# Patient Record
Sex: FEMALE | Race: WHITE | NOT HISPANIC OR LATINO | Employment: FULL TIME | URBAN - METROPOLITAN AREA
[De-identification: names, ages, dates, MRNs, and addresses within clinical notes are randomized per-mention and may not be internally consistent; named-entity substitution may affect disease eponyms.]

---

## 2017-01-23 ENCOUNTER — HISTORICAL (OUTPATIENT)
Dept: RADIOLOGY | Facility: HOSPITAL | Age: 52
End: 2017-01-23

## 2017-02-20 ENCOUNTER — HISTORICAL (OUTPATIENT)
Dept: ADMINISTRATIVE | Facility: HOSPITAL | Age: 52
End: 2017-02-20

## 2017-02-27 ENCOUNTER — HISTORICAL (OUTPATIENT)
Dept: INTERNAL MEDICINE | Facility: CLINIC | Age: 52
End: 2017-02-27

## 2017-03-03 ENCOUNTER — HISTORICAL (OUTPATIENT)
Dept: RADIOLOGY | Facility: HOSPITAL | Age: 52
End: 2017-03-03

## 2017-03-30 ENCOUNTER — HISTORICAL (OUTPATIENT)
Dept: INTERNAL MEDICINE | Facility: CLINIC | Age: 52
End: 2017-03-30

## 2017-05-26 ENCOUNTER — HISTORICAL (OUTPATIENT)
Dept: INTERNAL MEDICINE | Facility: CLINIC | Age: 52
End: 2017-05-26

## 2017-05-26 LAB
ABS NEUT (OLG): 4.62 X10(3)/MCL (ref 2.1–9.2)
ALBUMIN SERPL-MCNC: 4.3 GM/DL (ref 3.4–5)
ALBUMIN/GLOB SERPL: 1 RATIO (ref 1–2)
ALP SERPL-CCNC: 81 UNIT/L (ref 20–120)
ALT SERPL-CCNC: 17 UNIT/L
APPEARANCE, UA: ABNORMAL
AST SERPL-CCNC: 16 UNIT/L
BACTERIA #/AREA URNS AUTO: ABNORMAL /[HPF]
BASOPHILS # BLD AUTO: 0.04 X10(3)/MCL
BASOPHILS NFR BLD AUTO: 0 % (ref 0–1)
BILIRUB SERPL-MCNC: 0.8 MG/DL
BILIRUB UR QL STRIP: NEGATIVE
BILIRUBIN DIRECT+TOT PNL SERPL-MCNC: 0.1 MG/DL
BILIRUBIN DIRECT+TOT PNL SERPL-MCNC: 0.7 MG/DL
BUN SERPL-MCNC: 22 MG/DL (ref 7–25)
CALCIUM SERPL-MCNC: 9.4 MG/DL (ref 8.4–10.3)
CHLORIDE SERPL-SCNC: 103 MMOL/L (ref 96–110)
CHOLEST SERPL-MCNC: 168 MG/DL
CHOLEST/HDLC SERPL: 4.4 {RATIO} (ref 0–4.4)
CO2 SERPL-SCNC: 28 MMOL/L (ref 24–32)
COLOR UR: ABNORMAL
CREAT SERPL-MCNC: 0.82 MG/DL (ref 0.7–1.1)
EOSINOPHIL # BLD AUTO: 0.15 10*3/UL
EOSINOPHIL NFR BLD AUTO: 2 % (ref 0–5)
ERYTHROCYTE [DISTWIDTH] IN BLOOD BY AUTOMATED COUNT: 12.9 % (ref 11.5–14.5)
GLOBULIN SER-MCNC: 2.9 GM/ML (ref 2.3–3.5)
GLUCOSE (UA): NORMAL
GLUCOSE SERPL-MCNC: 125 MG/DL (ref 65–99)
HCT VFR BLD AUTO: 41.4 % (ref 35–46)
HDLC SERPL-MCNC: 38 MG/DL
HGB BLD-MCNC: 13.9 GM/DL (ref 12–16)
HGB UR QL STRIP: 0.06 MG/DL
HYALINE CASTS #/AREA URNS LPF: ABNORMAL /[LPF]
IMM GRANULOCYTES # BLD AUTO: 0.04 10*3/UL
IMM GRANULOCYTES NFR BLD AUTO: 0 %
KETONES UR QL STRIP: NEGATIVE
LDLC SERPL CALC-MCNC: 110 MG/DL (ref 0–130)
LEUKOCYTE ESTERASE UR QL STRIP: 500 LEU/UL
LYMPHOCYTES # BLD AUTO: 2.42 X10(3)/MCL
LYMPHOCYTES NFR BLD AUTO: 30 % (ref 15–40)
MCH RBC QN AUTO: 31.8 PG (ref 26–34)
MCHC RBC AUTO-ENTMCNC: 33.6 GM/DL (ref 31–37)
MCV RBC AUTO: 94.7 FL (ref 80–100)
MONOCYTES # BLD AUTO: 0.7 X10(3)/MCL
MONOCYTES NFR BLD AUTO: 9 % (ref 4–12)
NEUTROPHILS # BLD AUTO: 4.62 X10(3)/MCL
NEUTROPHILS NFR BLD AUTO: 58 X10(3)/MCL
NITRITE UR QL STRIP: NEGATIVE
PH UR STRIP: 7 [PH] (ref 4.5–8)
PLATELET # BLD AUTO: 242 X10(3)/MCL (ref 130–400)
PMV BLD AUTO: 9.6 FL (ref 7.4–10.4)
POTASSIUM SERPL-SCNC: 4.6 MMOL/L (ref 3.6–5.2)
PROT SERPL-MCNC: 7.2 GM/DL (ref 6–8)
PROT UR QL STRIP: 10 MG/DL
RBC # BLD AUTO: 4.37 X10(6)/MCL (ref 4–5.2)
RBC #/AREA URNS AUTO: ABNORMAL /[HPF]
SODIUM SERPL-SCNC: 139 MMOL/L (ref 135–146)
SP GR UR STRIP: 1.01 (ref 1–1.03)
SQUAMOUS #/AREA URNS LPF: >100 /[LPF]
T4 SERPL-MCNC: 8.99 MCG/DL (ref 6.09–12.23)
TRIGL SERPL-MCNC: 99 MG/DL
TSH SERPL-ACNC: 0.52 MIU/L (ref 0.5–5)
UROBILINOGEN UR STRIP-ACNC: NORMAL
VLDLC SERPL CALC-MCNC: 20 MG/DL
WBC # SPEC AUTO: 8 X10(3)/MCL (ref 4.5–11)
WBC #/AREA URNS AUTO: ABNORMAL /HPF

## 2017-09-25 ENCOUNTER — HISTORICAL (OUTPATIENT)
Dept: INTERNAL MEDICINE | Facility: CLINIC | Age: 52
End: 2017-09-25

## 2017-09-25 LAB
CREAT UR-MCNC: 145 MG/DL
EST. AVERAGE GLUCOSE BLD GHB EST-MCNC: 123 MG/DL
HBA1C MFR BLD: 5.9 % (ref 4.2–6.3)
MICROALBUMIN UR-MCNC: 26.5 MG/L (ref 0–19)
MICROALBUMIN/CREAT RATIO PNL UR: 18.3 MCG/MG CR (ref 0–29)

## 2018-02-15 LAB
COLOR STL: NORMAL
COLOR STL: NORMAL
CONSISTENCY STL: NORMAL
CONSISTENCY STL: NORMAL
HEMOCCULT SP1 STL QL: NEGATIVE
HEMOCCULT SP2 STL QL: NEGATIVE

## 2018-02-16 LAB
COLOR STL: NORMAL
CONSISTENCY STL: NORMAL

## 2018-02-19 ENCOUNTER — HISTORICAL (OUTPATIENT)
Dept: NEPHROLOGY | Facility: CLINIC | Age: 53
End: 2018-02-19

## 2018-02-19 LAB
ALBUMIN SERPL-MCNC: 3.9 GM/DL (ref 3.4–5)
ALBUMIN/GLOB SERPL: 1 RATIO (ref 1–2)
ALP SERPL-CCNC: 93 UNIT/L (ref 45–117)
ALT SERPL-CCNC: 20 UNIT/L (ref 12–78)
APPEARANCE, UA: ABNORMAL
AST SERPL-CCNC: 14 UNIT/L (ref 15–37)
BACTERIA #/AREA URNS AUTO: ABNORMAL /[HPF]
BILIRUB SERPL-MCNC: 0.8 MG/DL (ref 0.2–1)
BILIRUB UR QL STRIP: NEGATIVE
BILIRUBIN DIRECT+TOT PNL SERPL-MCNC: 0.2 MG/DL
BILIRUBIN DIRECT+TOT PNL SERPL-MCNC: 0.6 MG/DL
BUN SERPL-MCNC: 19 MG/DL (ref 7–18)
CALCIUM SERPL-MCNC: 9.4 MG/DL (ref 8.5–10.1)
CHLORIDE SERPL-SCNC: 107 MMOL/L (ref 98–107)
CHOLEST SERPL-MCNC: 164 MG/DL
CHOLEST/HDLC SERPL: 3.6 {RATIO} (ref 0–4.4)
CO2 SERPL-SCNC: 30 MMOL/L (ref 21–32)
COLOR UR: YELLOW
CREAT SERPL-MCNC: 0.9 MG/DL (ref 0.6–1.3)
CREAT UR-MCNC: 169 MG/DL
EST. AVERAGE GLUCOSE BLD GHB EST-MCNC: 128 MG/DL
GLOBULIN SER-MCNC: 3.5 GM/ML (ref 2.3–3.5)
GLUCOSE (UA): NORMAL
GLUCOSE SERPL-MCNC: 104 MG/DL (ref 74–106)
HBA1C MFR BLD: 6.1 % (ref 4.2–6.3)
HDLC SERPL-MCNC: 46 MG/DL
HGB UR QL STRIP: 0.03 MG/DL
HYALINE CASTS #/AREA URNS LPF: ABNORMAL /[LPF]
KETONES UR QL STRIP: NEGATIVE
LDLC SERPL CALC-MCNC: 100 MG/DL (ref 0–130)
LEUKOCYTE ESTERASE UR QL STRIP: 500 LEU/UL
MICROALBUMIN UR-MCNC: 34.2 MG/L (ref 0–19)
MICROALBUMIN/CREAT RATIO PNL UR: 20.2 MCG/MG CR (ref 0–29)
NITRITE UR QL STRIP: ABNORMAL
PH UR STRIP: 7 [PH] (ref 4.5–8)
POTASSIUM SERPL-SCNC: 4.5 MMOL/L (ref 3.5–5.1)
PROT SERPL-MCNC: 7.4 GM/DL (ref 6.4–8.2)
PROT UR QL STRIP: 20 MG/DL
RBC #/AREA URNS AUTO: ABNORMAL /[HPF]
SODIUM SERPL-SCNC: 143 MMOL/L (ref 136–145)
SP GR UR STRIP: 1.02 (ref 1–1.03)
SQUAMOUS #/AREA URNS LPF: >100 /[LPF]
TRIGL SERPL-MCNC: 90 MG/DL
UROBILINOGEN UR STRIP-ACNC: NORMAL
VLDLC SERPL CALC-MCNC: 18 MG/DL
WBC #/AREA URNS AUTO: ABNORMAL /HPF

## 2018-05-17 LAB
HUMAN PAPILLOMAVIRUS (HPV): NORMAL
PAP RECOMMENDATION EXT: NORMAL
PAP SMEAR: NORMAL

## 2018-08-15 ENCOUNTER — HISTORICAL (OUTPATIENT)
Dept: INTERNAL MEDICINE | Facility: CLINIC | Age: 53
End: 2018-08-15

## 2018-08-15 LAB
APPEARANCE, UA: CLEAR
BACTERIA #/AREA URNS AUTO: ABNORMAL /[HPF]
BILIRUB UR QL STRIP: NEGATIVE
BUN SERPL-MCNC: 22 MG/DL (ref 7–18)
CALCIUM SERPL-MCNC: 9 MG/DL (ref 8.5–10.1)
CHLORIDE SERPL-SCNC: 107 MMOL/L (ref 98–107)
CO2 SERPL-SCNC: 30 MMOL/L (ref 21–32)
COLOR UR: ABNORMAL
CREAT SERPL-MCNC: 0.8 MG/DL (ref 0.6–1.3)
CREAT UR-MCNC: 103 MG/DL
CREAT/UREA NIT SERPL: 28
EST. AVERAGE GLUCOSE BLD GHB EST-MCNC: 128 MG/DL
GLUCOSE (UA): NORMAL
GLUCOSE SERPL-MCNC: 111 MG/DL (ref 74–106)
HBA1C MFR BLD: 6.1 % (ref 4.2–6.3)
HGB UR QL STRIP: 0.03 MG/DL
HYALINE CASTS #/AREA URNS LPF: ABNORMAL /[LPF]
KETONES UR QL STRIP: NEGATIVE
LEUKOCYTE ESTERASE UR QL STRIP: 250 LEU/UL
MICROALBUMIN UR-MCNC: 28 MG/L (ref 0–19)
MICROALBUMIN/CREAT RATIO PNL UR: 27.2 MCG/MG CR (ref 0–29)
NITRITE UR QL STRIP: ABNORMAL
PH UR STRIP: 6 [PH] (ref 4.5–8)
POTASSIUM SERPL-SCNC: 4.7 MMOL/L (ref 3.5–5.1)
PROT UR QL STRIP: NEGATIVE
RBC #/AREA URNS AUTO: ABNORMAL /[HPF]
SODIUM SERPL-SCNC: 142 MMOL/L (ref 136–145)
SP GR UR STRIP: 1.02 (ref 1–1.03)
SQUAMOUS #/AREA URNS LPF: ABNORMAL /[LPF]
UROBILINOGEN UR STRIP-ACNC: NORMAL
WBC #/AREA URNS AUTO: ABNORMAL /HPF

## 2018-09-18 ENCOUNTER — HISTORICAL (OUTPATIENT)
Dept: WOUND CARE | Facility: HOSPITAL | Age: 53
End: 2018-09-18

## 2018-09-18 LAB
APPEARANCE, UA: CLEAR
BACTERIA #/AREA URNS AUTO: ABNORMAL /[HPF]
BILIRUB UR QL STRIP: NEGATIVE
COLOR UR: ABNORMAL
GLUCOSE (UA): NORMAL
HGB UR QL STRIP: 0.06 MG/DL
HYALINE CASTS #/AREA URNS LPF: ABNORMAL /[LPF]
KETONES UR QL STRIP: NEGATIVE
LEUKOCYTE ESTERASE UR QL STRIP: 500 LEU/UL
NITRITE UR QL STRIP: NEGATIVE
PH UR STRIP: 5.5 [PH] (ref 4.5–8)
PROT UR QL STRIP: NEGATIVE
RBC #/AREA URNS AUTO: ABNORMAL /[HPF]
SP GR UR STRIP: 1.02 (ref 1–1.03)
SQUAMOUS #/AREA URNS LPF: ABNORMAL /[LPF]
UROBILINOGEN UR STRIP-ACNC: NORMAL
WBC #/AREA URNS AUTO: ABNORMAL /HPF

## 2018-12-06 ENCOUNTER — HISTORICAL (OUTPATIENT)
Dept: ADMINISTRATIVE | Facility: HOSPITAL | Age: 53
End: 2018-12-06

## 2018-12-06 LAB
ABS NEUT (OLG): 3.4 X10(3)/MCL (ref 2.1–9.2)
APPEARANCE, UA: CLEAR
BACTERIA #/AREA URNS AUTO: ABNORMAL /[HPF]
BASOPHILS # BLD AUTO: 0.05 X10(3)/MCL
BASOPHILS NFR BLD AUTO: 1 %
BILIRUB UR QL STRIP: NEGATIVE
BUN SERPL-MCNC: 22 MG/DL (ref 7–18)
CALCIUM SERPL-MCNC: 9.5 MG/DL (ref 8.5–10.1)
CHLORIDE SERPL-SCNC: 107 MMOL/L (ref 98–107)
CO2 SERPL-SCNC: 31 MMOL/L (ref 21–32)
COLOR UR: ABNORMAL
CREAT SERPL-MCNC: 0.9 MG/DL (ref 0.6–1.3)
CREAT/UREA NIT SERPL: 24
EOSINOPHIL # BLD AUTO: 0.26 X10(3)/MCL
EOSINOPHIL NFR BLD AUTO: 4 %
ERYTHROCYTE [DISTWIDTH] IN BLOOD BY AUTOMATED COUNT: 12.2 % (ref 11.5–14.5)
GLUCOSE (UA): NORMAL
GLUCOSE SERPL-MCNC: 89 MG/DL (ref 74–106)
HCT VFR BLD AUTO: 39.3 % (ref 35–46)
HGB BLD-MCNC: 13 GM/DL (ref 12–16)
HGB UR QL STRIP: NEGATIVE
HYALINE CASTS #/AREA URNS LPF: ABNORMAL /[LPF]
IMM GRANULOCYTES # BLD AUTO: 0.01 10*3/UL
IMM GRANULOCYTES NFR BLD AUTO: 0 %
KETONES UR QL STRIP: NEGATIVE
LEUKOCYTE ESTERASE UR QL STRIP: 75 LEU/UL
LYMPHOCYTES # BLD AUTO: 2.39 X10(3)/MCL
LYMPHOCYTES NFR BLD AUTO: 35 % (ref 13–40)
MCH RBC QN AUTO: 31.7 PG (ref 26–34)
MCHC RBC AUTO-ENTMCNC: 33.1 GM/DL (ref 31–37)
MCV RBC AUTO: 95.9 FL (ref 80–100)
MONOCYTES # BLD AUTO: 0.69 X10(3)/MCL
MONOCYTES NFR BLD AUTO: 10 % (ref 4–12)
NEUTROPHILS # BLD AUTO: 3.4 X10(3)/MCL
NEUTROPHILS NFR BLD AUTO: 50 X10(3)/MCL
NITRITE UR QL STRIP: NEGATIVE
PH UR STRIP: 6 [PH] (ref 4.5–8)
PLATELET # BLD AUTO: 228 X10(3)/MCL (ref 130–400)
PMV BLD AUTO: 9.6 FL (ref 7.4–10.4)
POTASSIUM SERPL-SCNC: 4.9 MMOL/L (ref 3.5–5.1)
PROT UR QL STRIP: NEGATIVE
RBC # BLD AUTO: 4.1 X10(6)/MCL (ref 4–5.2)
RBC #/AREA URNS AUTO: ABNORMAL /[HPF]
SODIUM SERPL-SCNC: 142 MMOL/L (ref 136–145)
SP GR UR STRIP: 1.01 (ref 1–1.03)
SQUAMOUS #/AREA URNS LPF: ABNORMAL /[LPF]
T4 FREE SERPL-MCNC: 1.14 NG/DL (ref 0.76–1.46)
T4 SERPL-MCNC: 11.9 MCG/DL (ref 4.8–13.9)
TSH SERPL-ACNC: 0.48 MIU/L (ref 0.36–3.74)
UROBILINOGEN UR STRIP-ACNC: NORMAL
WBC # SPEC AUTO: 6.8 X10(3)/MCL (ref 4.5–11)
WBC #/AREA URNS AUTO: ABNORMAL /HPF

## 2019-04-04 ENCOUNTER — HISTORICAL (OUTPATIENT)
Dept: ADMINISTRATIVE | Facility: HOSPITAL | Age: 54
End: 2019-04-04

## 2019-04-04 ENCOUNTER — HISTORICAL (OUTPATIENT)
Dept: INTERNAL MEDICINE | Facility: CLINIC | Age: 54
End: 2019-04-04

## 2019-04-04 LAB
ALBUMIN SERPL-MCNC: 3.6 GM/DL (ref 3.4–5)
ALBUMIN/GLOB SERPL: 1.2 RATIO (ref 1.1–2)
ALP SERPL-CCNC: 93 UNIT/L (ref 45–117)
ALT SERPL-CCNC: 18 UNIT/L (ref 12–78)
APPEARANCE, UA: CLEAR
AST SERPL-CCNC: 10 UNIT/L (ref 15–37)
BACTERIA #/AREA URNS AUTO: ABNORMAL /[HPF]
BILIRUB SERPL-MCNC: 0.4 MG/DL (ref 0.2–1)
BILIRUB UR QL STRIP: NEGATIVE
BILIRUBIN DIRECT+TOT PNL SERPL-MCNC: <0.1 MG/DL
BILIRUBIN DIRECT+TOT PNL SERPL-MCNC: ABNORMAL MG/DL
BUN SERPL-MCNC: 23 MG/DL (ref 7–18)
CALCIUM SERPL-MCNC: 8.7 MG/DL (ref 8.5–10.1)
CHLORIDE SERPL-SCNC: 110 MMOL/L (ref 98–107)
CHOLEST SERPL-MCNC: 139 MG/DL
CHOLEST/HDLC SERPL: 3.5 {RATIO} (ref 0–4.4)
CO2 SERPL-SCNC: 29 MMOL/L (ref 21–32)
COLOR UR: ABNORMAL
CREAT SERPL-MCNC: 0.9 MG/DL (ref 0.6–1.3)
CREAT UR-MCNC: 122 MG/DL
EST. AVERAGE GLUCOSE BLD GHB EST-MCNC: 131 MG/DL
GLOBULIN SER-MCNC: 3.1 GM/ML (ref 2.3–3.5)
GLUCOSE (UA): NORMAL
GLUCOSE SERPL-MCNC: 110 MG/DL (ref 74–106)
HBA1C MFR BLD: 6.2 % (ref 4.2–6.3)
HDLC SERPL-MCNC: 40 MG/DL
HGB UR QL STRIP: 0.06 MG/DL
HYALINE CASTS #/AREA URNS LPF: ABNORMAL /[LPF]
KETONES UR QL STRIP: NEGATIVE
LDLC SERPL CALC-MCNC: 61 MG/DL (ref 0–130)
LEUKOCYTE ESTERASE UR QL STRIP: 500 LEU/UL
MICROALBUMIN UR-MCNC: 72.6 MG/L (ref 0–19)
MICROALBUMIN/CREAT RATIO PNL UR: 59.5 MCG/MG CR (ref 0–29)
NITRITE UR QL STRIP: NEGATIVE
PH UR STRIP: 6 [PH] (ref 4.5–8)
POTASSIUM SERPL-SCNC: 4.2 MMOL/L (ref 3.5–5.1)
PROT SERPL-MCNC: 6.7 GM/DL (ref 6.4–8.2)
PROT UR QL STRIP: NEGATIVE
RBC #/AREA URNS AUTO: ABNORMAL /[HPF]
SODIUM SERPL-SCNC: 143 MMOL/L (ref 136–145)
SP GR UR STRIP: 1.01 (ref 1–1.03)
SQUAMOUS #/AREA URNS LPF: ABNORMAL /[LPF]
TRIGL SERPL-MCNC: 190 MG/DL
UROBILINOGEN UR STRIP-ACNC: NORMAL
VLDLC SERPL CALC-MCNC: 38 MG/DL
WBC #/AREA URNS AUTO: ABNORMAL /HPF

## 2019-09-04 ENCOUNTER — HISTORICAL (OUTPATIENT)
Dept: ADMINISTRATIVE | Facility: HOSPITAL | Age: 54
End: 2019-09-04

## 2019-09-04 LAB
APPEARANCE, UA: ABNORMAL
BACTERIA #/AREA URNS AUTO: ABNORMAL /[HPF]
BILIRUB UR QL STRIP: NEGATIVE
COLOR UR: ABNORMAL
DEPRECATED CALCIDIOL+CALCIFEROL SERPL-MC: 29.86 NG/ML (ref 30–80)
EST. AVERAGE GLUCOSE BLD GHB EST-MCNC: 180 MG/DL
GLUCOSE (UA): NORMAL
HBA1C MFR BLD: 7.9 % (ref 4.2–6.3)
HGB UR QL STRIP: 0.03 MG/DL
HYALINE CASTS #/AREA URNS LPF: ABNORMAL /[LPF]
KETONES UR QL STRIP: NEGATIVE
LEUKOCYTE ESTERASE UR QL STRIP: 500 LEU/UL
NITRITE UR QL STRIP: ABNORMAL
PH UR STRIP: 6 [PH] (ref 4.5–8)
PROT UR QL STRIP: NEGATIVE
RBC #/AREA URNS AUTO: ABNORMAL /[HPF]
SP GR UR STRIP: 1.01 (ref 1–1.03)
SQUAMOUS #/AREA URNS LPF: ABNORMAL /[LPF]
UROBILINOGEN UR STRIP-ACNC: NORMAL
WBC #/AREA URNS AUTO: ABNORMAL /HPF

## 2019-09-20 ENCOUNTER — HISTORICAL (OUTPATIENT)
Dept: RADIOLOGY | Facility: HOSPITAL | Age: 54
End: 2019-09-20

## 2020-03-17 ENCOUNTER — HISTORICAL (OUTPATIENT)
Dept: INTERNAL MEDICINE | Facility: CLINIC | Age: 55
End: 2020-03-17

## 2020-03-17 LAB
ALBUMIN SERPL-MCNC: 3.5 GM/DL (ref 3.4–5)
ALBUMIN/GLOB SERPL: 1 RATIO (ref 1.1–2)
ALP SERPL-CCNC: 92 UNIT/L (ref 45–117)
ALT SERPL-CCNC: 26 UNIT/L (ref 12–78)
AST SERPL-CCNC: 17 UNIT/L (ref 15–37)
BILIRUB SERPL-MCNC: 0.4 MG/DL (ref 0.2–1)
BILIRUBIN DIRECT+TOT PNL SERPL-MCNC: 0.1 MG/DL (ref 0–0.2)
BILIRUBIN DIRECT+TOT PNL SERPL-MCNC: 0.3 MG/DL
BUN SERPL-MCNC: 15 MG/DL (ref 7–18)
CALCIUM SERPL-MCNC: 9.1 MG/DL (ref 8.5–10.1)
CHLORIDE SERPL-SCNC: 111 MMOL/L (ref 98–107)
CHOLEST SERPL-MCNC: 158 MG/DL
CHOLEST/HDLC SERPL: 3.2 {RATIO} (ref 0–4.4)
CO2 SERPL-SCNC: 31 MMOL/L (ref 21–32)
CREAT SERPL-MCNC: 0.9 MG/DL (ref 0.6–1.3)
EST. AVERAGE GLUCOSE BLD GHB EST-MCNC: 146 MG/DL
GLOBULIN SER-MCNC: 3.5 GM/ML (ref 2.3–3.5)
GLUCOSE SERPL-MCNC: 105 MG/DL (ref 74–106)
HBA1C MFR BLD: 6.7 % (ref 4.2–6.3)
HDLC SERPL-MCNC: 50 MG/DL (ref 40–59)
LDLC SERPL CALC-MCNC: 91 MG/DL
POTASSIUM SERPL-SCNC: 4.9 MMOL/L (ref 3.5–5.1)
PROT SERPL-MCNC: 7 GM/DL (ref 6.4–8.2)
SODIUM SERPL-SCNC: 142 MMOL/L (ref 136–145)
TRIGL SERPL-MCNC: 83 MG/DL
VLDLC SERPL CALC-MCNC: 17 MG/DL

## 2020-06-12 ENCOUNTER — HISTORICAL (OUTPATIENT)
Dept: ENDOSCOPY | Facility: HOSPITAL | Age: 55
End: 2020-06-12

## 2020-06-12 LAB — CRC RECOMMENDATION EXT: NORMAL

## 2020-09-21 ENCOUNTER — HISTORICAL (OUTPATIENT)
Dept: ADMINISTRATIVE | Facility: HOSPITAL | Age: 55
End: 2020-09-21

## 2020-10-05 ENCOUNTER — HISTORICAL (OUTPATIENT)
Dept: ADMINISTRATIVE | Facility: HOSPITAL | Age: 55
End: 2020-10-05

## 2020-11-02 ENCOUNTER — HISTORICAL (OUTPATIENT)
Dept: ADMINISTRATIVE | Facility: HOSPITAL | Age: 55
End: 2020-11-02

## 2020-12-22 ENCOUNTER — HISTORICAL (OUTPATIENT)
Dept: INTERNAL MEDICINE | Facility: CLINIC | Age: 55
End: 2020-12-22

## 2020-12-22 LAB
ABS NEUT (OLG): 3.14 X10(3)/MCL (ref 2.1–9.2)
ALBUMIN SERPL-MCNC: 3.9 GM/DL (ref 3.5–5)
ALBUMIN/GLOB SERPL: 1.6 RATIO (ref 1.1–2)
ALP SERPL-CCNC: 80 UNIT/L (ref 40–150)
ALT SERPL-CCNC: 19 UNIT/L (ref 0–55)
AST SERPL-CCNC: 19 UNIT/L (ref 5–34)
BASOPHILS # BLD AUTO: 0 X10(3)/MCL (ref 0–0.2)
BASOPHILS NFR BLD AUTO: 1 %
BILIRUB SERPL-MCNC: 0.6 MG/DL
BILIRUBIN DIRECT+TOT PNL SERPL-MCNC: 0.3 MG/DL (ref 0–0.5)
BILIRUBIN DIRECT+TOT PNL SERPL-MCNC: 0.3 MG/DL (ref 0–0.8)
BUN SERPL-MCNC: 15 MG/DL (ref 9.8–20.1)
CALCIUM SERPL-MCNC: 9.4 MG/DL (ref 8.4–10.2)
CHLORIDE SERPL-SCNC: 107 MMOL/L (ref 98–107)
CHOLEST SERPL-MCNC: 128 MG/DL
CHOLEST/HDLC SERPL: 3 {RATIO} (ref 0–5)
CO2 SERPL-SCNC: 29 MMOL/L (ref 22–29)
CREAT SERPL-MCNC: 0.75 MG/DL (ref 0.55–1.02)
CREAT UR-MCNC: 80.3 MG/DL (ref 45–106)
DEPRECATED CALCIDIOL+CALCIFEROL SERPL-MC: 38.8 NG/ML (ref 30–80)
EOSINOPHIL # BLD AUTO: 0.2 X10(3)/MCL (ref 0–0.9)
EOSINOPHIL NFR BLD AUTO: 3 %
ERYTHROCYTE [DISTWIDTH] IN BLOOD BY AUTOMATED COUNT: 13 % (ref 11.5–14.5)
EST. AVERAGE GLUCOSE BLD GHB EST-MCNC: 131.2 MG/DL
GLOBULIN SER-MCNC: 2.5 GM/DL (ref 2.4–3.5)
GLUCOSE SERPL-MCNC: 113 MG/DL (ref 74–100)
HBA1C MFR BLD: 6.2 %
HCT VFR BLD AUTO: 38.4 % (ref 35–46)
HDLC SERPL-MCNC: 39 MG/DL (ref 35–60)
HGB BLD-MCNC: 12.1 GM/DL (ref 12–16)
IMM GRANULOCYTES # BLD AUTO: 0.01 10*3/UL
IMM GRANULOCYTES NFR BLD AUTO: 0 %
LDLC SERPL CALC-MCNC: 75 MG/DL (ref 50–140)
LYMPHOCYTES # BLD AUTO: 2.3 X10(3)/MCL (ref 0.6–4.6)
LYMPHOCYTES NFR BLD AUTO: 36 %
MCH RBC QN AUTO: 30.6 PG (ref 26–34)
MCHC RBC AUTO-ENTMCNC: 31.5 GM/DL (ref 31–37)
MCV RBC AUTO: 97 FL (ref 80–100)
MICROALBUMIN UR-MCNC: 44.8 UG/ML
MICROALBUMIN/CREAT RATIO PNL UR: 55.8 MG/GM CR (ref 0–30)
MONOCYTES # BLD AUTO: 0.7 X10(3)/MCL (ref 0.1–1.3)
MONOCYTES NFR BLD AUTO: 11 %
NEUTROPHILS # BLD AUTO: 3.14 X10(3)/MCL (ref 2.1–9.2)
NEUTROPHILS NFR BLD AUTO: 49 %
PLATELET # BLD AUTO: 221 X10(3)/MCL (ref 130–400)
PMV BLD AUTO: 9.6 FL (ref 7.4–10.4)
POTASSIUM SERPL-SCNC: 4.6 MMOL/L (ref 3.5–5.1)
PROT SERPL-MCNC: 6.4 GM/DL (ref 6.4–8.3)
RBC # BLD AUTO: 3.96 X10(6)/MCL (ref 4–5.2)
SODIUM SERPL-SCNC: 144 MMOL/L (ref 136–145)
TRIGL SERPL-MCNC: 69 MG/DL (ref 37–140)
TSH SERPL-ACNC: 0.42 UIU/ML (ref 0.35–4.94)
VLDLC SERPL CALC-MCNC: 14 MG/DL
WBC # SPEC AUTO: 6.4 X10(3)/MCL (ref 4.5–11)

## 2021-01-28 ENCOUNTER — HISTORICAL (OUTPATIENT)
Dept: RADIOLOGY | Facility: HOSPITAL | Age: 56
End: 2021-01-28

## 2021-07-01 ENCOUNTER — HISTORICAL (OUTPATIENT)
Dept: INTERNAL MEDICINE | Facility: CLINIC | Age: 56
End: 2021-07-01

## 2021-07-01 LAB
ABS NEUT (OLG): 5.35 X10(3)/MCL (ref 2.1–9.2)
ALBUMIN SERPL-MCNC: 3.9 GM/DL (ref 3.5–5)
ALBUMIN/GLOB SERPL: 1.3 RATIO (ref 1.1–2)
ALP SERPL-CCNC: 75 UNIT/L (ref 40–150)
ALT SERPL-CCNC: 18 UNIT/L (ref 0–55)
AST SERPL-CCNC: 16 UNIT/L (ref 5–34)
BASOPHILS # BLD AUTO: 0 X10(3)/MCL (ref 0–0.2)
BASOPHILS NFR BLD AUTO: 0 %
BILIRUB SERPL-MCNC: 0.6 MG/DL
BILIRUBIN DIRECT+TOT PNL SERPL-MCNC: 0.2 MG/DL (ref 0–0.5)
BILIRUBIN DIRECT+TOT PNL SERPL-MCNC: 0.4 MG/DL (ref 0–0.8)
BUN SERPL-MCNC: 18.4 MG/DL (ref 9.8–20.1)
CALCIUM SERPL-MCNC: 10 MG/DL (ref 8.4–10.2)
CHLORIDE SERPL-SCNC: 103 MMOL/L (ref 98–107)
CHOLEST SERPL-MCNC: 163 MG/DL
CHOLEST/HDLC SERPL: 4 {RATIO} (ref 0–5)
CO2 SERPL-SCNC: 29 MMOL/L (ref 22–29)
CREAT SERPL-MCNC: 0.97 MG/DL (ref 0.55–1.02)
CREAT UR-MCNC: 172.5 MG/DL (ref 45–106)
DEPRECATED CALCIDIOL+CALCIFEROL SERPL-MC: 37.1 NG/ML (ref 30–80)
EOSINOPHIL # BLD AUTO: 0.2 X10(3)/MCL (ref 0–0.9)
EOSINOPHIL NFR BLD AUTO: 2 %
ERYTHROCYTE [DISTWIDTH] IN BLOOD BY AUTOMATED COUNT: 12.6 % (ref 11.5–14.5)
EST. AVERAGE GLUCOSE BLD GHB EST-MCNC: 139.8 MG/DL
GLOBULIN SER-MCNC: 3 GM/DL (ref 2.4–3.5)
GLUCOSE SERPL-MCNC: 135 MG/DL (ref 74–100)
HBA1C MFR BLD: 6.5 %
HCT VFR BLD AUTO: 41.6 % (ref 35–46)
HDLC SERPL-MCNC: 37 MG/DL (ref 35–60)
HGB BLD-MCNC: 13.5 GM/DL (ref 12–16)
IMM GRANULOCYTES # BLD AUTO: 0.03 10*3/UL
IMM GRANULOCYTES NFR BLD AUTO: 0 %
LDLC SERPL CALC-MCNC: 97 MG/DL (ref 50–140)
LYMPHOCYTES # BLD AUTO: 2.9 X10(3)/MCL (ref 0.6–4.6)
LYMPHOCYTES NFR BLD AUTO: 31 %
MCH RBC QN AUTO: 31.2 PG (ref 26–34)
MCHC RBC AUTO-ENTMCNC: 32.5 GM/DL (ref 31–37)
MCV RBC AUTO: 96.1 FL (ref 80–100)
MICROALBUMIN UR-MCNC: 123.2 MG/L
MICROALBUMIN/CREAT RATIO PNL UR: 71.4 MG/GM CR (ref 0–30)
MONOCYTES # BLD AUTO: 0.9 X10(3)/MCL (ref 0.1–1.3)
MONOCYTES NFR BLD AUTO: 9 %
NEUTROPHILS # BLD AUTO: 5.35 X10(3)/MCL (ref 2.1–9.2)
NEUTROPHILS NFR BLD AUTO: 57 %
NRBC BLD AUTO-RTO: 0 % (ref 0–0.2)
PLATELET # BLD AUTO: 265 X10(3)/MCL (ref 130–400)
PMV BLD AUTO: 9.3 FL (ref 7.4–10.4)
POTASSIUM SERPL-SCNC: 4.4 MMOL/L (ref 3.5–5.1)
PROT SERPL-MCNC: 6.9 GM/DL (ref 6.4–8.3)
RBC # BLD AUTO: 4.33 X10(6)/MCL (ref 4–5.2)
SODIUM SERPL-SCNC: 142 MMOL/L (ref 136–145)
TRIGL SERPL-MCNC: 147 MG/DL (ref 37–140)
VLDLC SERPL CALC-MCNC: 29 MG/DL
WBC # SPEC AUTO: 9.4 X10(3)/MCL (ref 4.5–11)

## 2021-07-15 ENCOUNTER — HISTORICAL (OUTPATIENT)
Dept: ADMINISTRATIVE | Facility: HOSPITAL | Age: 56
End: 2021-07-15

## 2022-02-01 ENCOUNTER — HISTORICAL (OUTPATIENT)
Dept: INTERNAL MEDICINE | Facility: CLINIC | Age: 57
End: 2022-02-01

## 2022-02-01 LAB
ABS NEUT (OLG): 6.11 (ref 2.1–9.2)
ALBUMIN SERPL-MCNC: 4 G/DL (ref 3.5–5)
ALBUMIN/GLOB SERPL: 1.3 {RATIO} (ref 1.1–2)
ALP SERPL-CCNC: 63 U/L (ref 40–150)
ALT SERPL-CCNC: 23 U/L (ref 0–55)
APPEARANCE, UA: NORMAL
AST SERPL-CCNC: 18 U/L (ref 5–34)
BACTERIA SPEC CULT: NORMAL
BASOPHILS # BLD AUTO: 0 10*3/UL (ref 0–0.2)
BASOPHILS NFR BLD AUTO: 0 %
BILIRUB SERPL-MCNC: 0.6 MG/DL
BILIRUB UR QL STRIP: NEGATIVE
BILIRUBIN DIRECT+TOT PNL SERPL-MCNC: 0.2 (ref 0–0.5)
BILIRUBIN DIRECT+TOT PNL SERPL-MCNC: 0.4 (ref 0–0.8)
BUN SERPL-MCNC: 13.6 MG/DL (ref 9.8–20.1)
CALCIUM SERPL-MCNC: 10 MG/DL (ref 8.7–10.5)
CHLORIDE SERPL-SCNC: 104 MMOL/L (ref 98–107)
CHOLEST SERPL-MCNC: 153 MG/DL
CHOLEST/HDLC SERPL: 4 {RATIO} (ref 0–5)
CO2 SERPL-SCNC: 27 MMOL/L (ref 22–29)
COLOR UR: YELLOW
CREAT SERPL-MCNC: 1.19 MG/DL (ref 0.55–1.02)
CREAT UR-MCNC: 356.5 MG/DL (ref 45–106)
EOSINOPHIL # BLD AUTO: 0.1 10*3/UL (ref 0–0.9)
EOSINOPHIL NFR BLD AUTO: 2 %
ERYTHROCYTE [DISTWIDTH] IN BLOOD BY AUTOMATED COUNT: 13.2 % (ref 11.5–14.5)
EST. AVERAGE GLUCOSE BLD GHB EST-MCNC: 137 MG/DL
FLAG2 (OHS): 70
FLAG3 (OHS): 80
FLAGS (OHS): 70
GLOBULIN SER-MCNC: 3 G/DL (ref 2.4–3.5)
GLUCOSE (UA): NORMAL
GLUCOSE SERPL-MCNC: 135 MG/DL (ref 74–100)
HBA1C MFR BLD: 6.4 %
HCT VFR BLD AUTO: 40.2 % (ref 35–46)
HDLC SERPL-MCNC: 42 MG/DL (ref 35–60)
HGB BLD-MCNC: 13.1 G/DL (ref 12–16)
HGB UR QL STRIP: NORMAL
HYALINE CASTS #/AREA URNS LPF: NORMAL /[LPF]
ICTERIC INTERF INDEX SERPL-ACNC: 0
IMM GRANULOCYTES # BLD AUTO: 0.03 10*3/UL
IMM GRANULOCYTES NFR BLD AUTO: 0 %
IMM. NE 1 SUSPECT FLAG (OHS): 10
KETONES UR QL STRIP: NEGATIVE
LDLC SERPL CALC-MCNC: 90 MG/DL (ref 50–140)
LEFT EYE DM RETINOPATHY: NEGATIVE
LEUKOCYTE ESTERASE UR QL STRIP: 500
LIPEMIC INTERF INDEX SERPL-ACNC: 4
LOW EVENT # SUSPECT FLAG (OHS): 70
LYMPHOCYTES # BLD AUTO: 2 10*3/UL (ref 0.6–4.6)
LYMPHOCYTES NFR BLD AUTO: 21 %
MANUAL DIFF? (OHS): NO
MCH RBC QN AUTO: 31.5 PG (ref 26–34)
MCHC RBC AUTO-ENTMCNC: 32.6 G/DL (ref 31–37)
MCV RBC AUTO: 96.6 FL (ref 80–100)
MICROALBUMIN UR-MCNC: 220.2
MICROALBUMIN/CREAT RATIO PNL UR: 61.8 (ref 0–30)
MO BLASTS SUSPECT FLAG (OHS): 40
MONOCYTES # BLD AUTO: 0.9 10*3/UL (ref 0.1–1.3)
MONOCYTES NFR BLD AUTO: 10 %
MUCOUS THREADS URNS QL MICRO: NORMAL
NEUTROPHILS # BLD AUTO: 6.11 10*3/UL (ref 2.1–9.2)
NEUTROPHILS NFR BLD AUTO: 66 %
NITRITE UR QL STRIP: NORMAL
NRBC BLD AUTO-RTO: 0 % (ref 0–0.2)
PH UR STRIP: 7 [PH] (ref 4.5–8)
PLATELET # BLD AUTO: 298 10*3/UL (ref 130–400)
PMV BLD AUTO: 9.7 FL (ref 7.4–10.4)
POTASSIUM SERPL-SCNC: 4 MMOL/L (ref 3.5–5.1)
PROT SERPL-MCNC: 7 G/DL (ref 6.4–8.3)
PROT UR QL STRIP: NORMAL
RBC # BLD AUTO: 4.16 10*6/UL (ref 4–5.2)
RBC #/AREA URNS HPF: NORMAL /[HPF] (ref 0–5)
RIGHT EYE DM RETINOPATHY: NEGATIVE
SODIUM SERPL-SCNC: 140 MMOL/L (ref 136–145)
SP GR UR STRIP: 1.02 (ref 1–1.03)
SQUAMOUS EPITHELIAL, UA: NORMAL
T4 FREE SERPL-MCNC: 1.26 NG/DL (ref 0.7–1.48)
TRIGL SERPL-MCNC: 103 MG/DL (ref 37–140)
TSH SERPL-ACNC: 0.32 M[IU]/L (ref 0.35–4.94)
UROBILINOGEN UR STRIP-ACNC: NORMAL
VLDLC SERPL CALC-MCNC: 21 MG/DL
WBC # SPEC AUTO: 9.2 10*3/UL (ref 4.5–11)
WBC #/AREA URNS HPF: >100 /[HPF] (ref 0–5)

## 2022-02-11 ENCOUNTER — HISTORICAL (OUTPATIENT)
Dept: ADMINISTRATIVE | Facility: HOSPITAL | Age: 57
End: 2022-02-11

## 2022-02-11 ENCOUNTER — HISTORICAL (OUTPATIENT)
Dept: RADIOLOGY | Facility: HOSPITAL | Age: 57
End: 2022-02-11

## 2022-04-09 ENCOUNTER — HISTORICAL (OUTPATIENT)
Dept: ADMINISTRATIVE | Facility: HOSPITAL | Age: 57
End: 2022-04-09
Payer: MEDICAID

## 2022-04-29 VITALS
HEIGHT: 67 IN | DIASTOLIC BLOOD PRESSURE: 70 MMHG | WEIGHT: 279.13 LBS | HEIGHT: 67 IN | SYSTOLIC BLOOD PRESSURE: 126 MMHG | BODY MASS INDEX: 39.72 KG/M2 | SYSTOLIC BLOOD PRESSURE: 135 MMHG | DIASTOLIC BLOOD PRESSURE: 81 MMHG | HEIGHT: 67 IN | BODY MASS INDEX: 45.99 KG/M2 | BODY MASS INDEX: 43.81 KG/M2 | WEIGHT: 293 LBS | HEIGHT: 67 IN | WEIGHT: 293 LBS | DIASTOLIC BLOOD PRESSURE: 84 MMHG | HEIGHT: 66 IN | SYSTOLIC BLOOD PRESSURE: 125 MMHG | BODY MASS INDEX: 47.09 KG/M2 | WEIGHT: 293 LBS | BODY MASS INDEX: 45.99 KG/M2 | BODY MASS INDEX: 45.99 KG/M2 | DIASTOLIC BLOOD PRESSURE: 83 MMHG | SYSTOLIC BLOOD PRESSURE: 116 MMHG | WEIGHT: 293 LBS | SYSTOLIC BLOOD PRESSURE: 127 MMHG | DIASTOLIC BLOOD PRESSURE: 85 MMHG | HEIGHT: 67 IN

## 2022-05-02 NOTE — HISTORICAL [facility] CERNER
This is a historical note converted from Perfecto. Formatting and pictures may have been removed.  Please reference Perfecto for original formatting and attached multimedia. Chief Complaint  Left Ankle/Foot Pain  History of Present Illness  Ms Kraft returns clinic today now 8 weeks status post?left ankle injury.? She has been weightbearing as tolerated in her boot since she last saw us in clinic. ?She reports that she is doing very well?and performing most of her activities in the boot. 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 She is no longer taking any pain medication.  Review of Systems  Constitutional:?no fever, fatigue, weakness  Eye:?no vision loss, eye redness, drainage, or pain  ENMT:?no sore throat, ear pain, sinus pain/congestion, nasal congestion/drainage  Respiratory:?no cough, no wheezing, no shortness of breath  Cardiovascular:?no chest pain, no palpitations, no edema  Gastrointestinal:?no nausea, vomiting, or diarrhea. No abdominal pain  ?  ?  Physical Exam  Vitals & Measurements  HT:?170.00?cm? WT:?133.800?kg? BMI:?46.3?  Left leg: Patient neurovascularly intact distally in the left foot.? No tenderness palpation fracture site.??Limited range of motion of the left ankle secondary to immobilization.  Assessment/Plan  1.?Fracture of lateral malleolus of left ankle?S82.62XA  ?Overall patient is doing very well status post?left ankle fracture.? I like her to gradually transition himself out of her cam boot and return to normal shoewear over the course of the next 2 to 3 weeks. ?She may to return to her?activities as tolerated in a graduated fashion.? We will write her a letter to allow her to return to work when she has discontinued use of her cam boot.? She may return to see us in 6 to 8 weeks if she continues to have any ongoing issues or concerns.  Ordered:  Clinic Follow-up PRN, 11/02/20 10:10:00 CST  Post-Op follow-up visit [id number] PC, Fracture of  lateral malleolus of left ankle  Pain in left ankle, [facility], 20 10:10:00 CST  ?  Orders:  XR Ankle Left Minimum 3 Views, Routine, 20 9:55:00 CST, Pain, None, Ambulatory, Rad Type, Pain in left ankle, Not Scheduled, 20 9:55:00 CST  Referrals  Clinic Follow-up PRN, 20 10:10:00 CST   Problem List/Past Medical History  Ongoing  Anxiety  BMI 40.0-44.9, adult  Chronic back pain  Chronic upper back pain  CKD stage 2 due to type 2 diabetes mellitus  Diabetes  Fracture of lateral malleolus of left ankle  Frequent UTI  Hypercholesteremia  Hypertension  Left knee pain  Metatarsal stress fracture of left foot  Morbid obesity(  Probable Diagnosis  )  Numbness of fingers  Osteoarthritis cervical spine  Osteoarthritis of lumbar spine  Pain  Sinusitis  Swelling  Tobacco user  Tobacco user(  Probable Diagnosis  )  Historical  No qualifying data  Procedure/Surgical History  Application of short leg splint (calf to foot) (2020)  Biopsy Gastrointestinal (None) (2020)  Colonoscopy (None) (2020)  Colonoscopy, flexible; with biopsy, single or multiple (2020)  Excision of Sigmoid Colon, Via Natural or Artificial Opening Endoscopic, Diagnostic (2020)  Injection(s); single or multiple trigger point(s), 1 or 2 muscle(s) (2019)  Introduction of Anesthetic Agent into Muscle, Percutaneous Approach (2019)  L kidney stone removal ()   section (1993)   section (1986)   section (1985)   x 3  left kidney stone removal  lithotripsy x 3  Tonsillectomy and adenoidectomy; age 12 or over   Medications  acetaminophen-hydrocodone 325 mg-5 mg oral tablet, 1 tab(s), Oral, TID,? ?Not Taking, Completed Rx  Astelin 137 mcg/inh nasal spray, 1 spray(s), Nasal, BID, 11 refills  Calcium 600+D, Oral, Daily  Cranberry oral tablet  fluticasone 50 mcg/inh nasal spray, 2 spray(s), Nasal, At Bedtime, 11 refills  furosemide 20 mg oral tablet,  See Instructions, 2 refills  furosemide 20 mg oral tablet, See Instructions,? ?Not Taking, Completed Rx  gabapentin 300 mg oral capsule, 300 mg= 1 cap(s), Oral, TID, 5 refills  lidocaine 0.5% topical gel, 1 abram, TOP, TID, 1 refills,? ?Not taking  lisinopril 40 mg oral tablet, 40 mg= 1 tab(s), Oral, Daily, 5 refills  loratadine 10 mg oral tablet, 10 mg= 1 tab(s), Oral, Daily, 3 refills  lovastatin 40 mg oral tablet, 40 mg= 1 tab(s), Oral, Once a day (at bedtime), 3 refills  metFORMIN 1000 mg oral tablet, 1000 mg= 1 tab(s), Oral, BID, 5 refills,? ?Not taking  metformin 500 mg oral tablet, 500 mg= 1 tab(s), Oral, BID  Mobic 15 mg oral tablet, 15 mg= 1 tab(s), Oral, Daily, 3 refills  MoviPrep oral powder for reconstitution,? ?Not taking  Outpatient Physical Therapy, See Instructions,? ?Not taking  Vitamin B12,? ?Not taking  Allergies  codeine  naproxen  Social History  Abuse/Neglect  No, No, Yes, 11/02/2020  Alcohol  Current, Liquor, 1-2 times per year, 09/04/2019  Employment/School  Work/School description: homemaker. Operates hazardous equipment: No., 05/06/2016  Exercise  Self assessment: Fair condition., 05/06/2016  Home/Environment  Lives with Significant other. Living situation: Home/Independent. Alcohol abuse in household: No. Substance abuse in household: No. Smoker in household: Yes. Injuries/Abuse/Neglect in household: No. Feels unsafe at home: No. Family/Friends available for support: Yes. Concern for family members at home: No. Major illness in household: No. Financial concerns: No. TV/Computer concerns: No., 05/06/2016  Nutrition/Health  Regular, Wants to lose weight: No., 05/06/2016  Other  Sexual  Gender Identity Identifies as female., 10/05/2020  Substance Use  Never, 12/04/2015  Tobacco  10 or more cigarettes (1/2 pack or more)/day in last 30 days, No, 11/02/2020  Family History  Cancer: Father.  Cardiac arrest.: Mother.  Dialysis: Mother.  Hypertension.: Father.  Kidney disease: Mother.  Primary  malignant neoplasm of bladder: Father.  Primary malignant neoplasm of prostate: Father.  Renal failure syndrome.: Mother.  Immunizations  Vaccine Date Status   tetanus/diphtheria/pertussis, acel(Tdap) 04/04/2019 Given   measles/mumps/rubella virus vaccine 05/07/2002 Recorded   hepatitis B pediatric vaccine 05/07/2002 Recorded   Health Maintenance  Health Maintenance  ???Pending?(in the next year)  ??? ??OverDue  ??? ? ? ?Diabetes Maintenance-Foot Exam due??04/03/20??and every 1??year(s)  ??? ? ? ?ADL Screening due??09/04/20??and every 1??year(s)  ??? ??Due?  ??? ? ? ?Influenza Vaccine due??10/01/20??and every 1??day(s)  ??? ? ? ?Zoster Vaccine due??11/02/20??Unknown Frequency  ??? ??Due In Future?  ??? ? ? ?Obesity Screening not due until??01/01/21??and every 1??year(s)  ??? ? ? ?Smoking Cessation not due until??01/01/21??and every 1??year(s)  ??? ? ? ?Alcohol Misuse Screening not due until??01/02/21??and every 1??year(s)  ??? ? ? ?Diabetes Maintenance-HgbA1c not due until??03/17/21??and every 1??year(s)  ??? ? ? ?Hypertension Management-BMP not due until??03/17/21??and every 1??year(s)  ??? ? ? ?Diabetes Maintenance-Fasting Lipid Profile not due until??03/17/21??and every 1??year(s)  ??? ? ? ?Diabetes Maintenance-Serum Creatinine not due until??03/17/21??and every 1??year(s)  ??? ? ? ?Cervical Cancer Screening not due until??05/16/21??and every 3??year(s)  ??? ? ? ?Diabetes Maintenance-Eye Exam not due until??09/10/21??and every 2??year(s)  ??? ? ? ?Diabetes Maintenance-Medication Prescribed not due until??09/11/21??and every 1??year(s)  ??? ? ? ?Breast Cancer Screening not due until??09/19/21??and every 2??year(s)  ??? ? ? ?Blood Pressure Screening not due until??10/05/21??and every 1??year(s)  ??? ? ? ?Hypertension Management-Blood Pressure not due until??10/05/21??and every 1??year(s)  ???Satisfied?(in the past 1 year)  ??? ??Satisfied?  ??? ? ? ?Alcohol Misuse Screening on??07/27/20.??Satisfied by Frederic PERSAUD,  Delia BEEBE  ??? ? ? ?Blood Pressure Screening on??10/05/20.??Satisfied by Marco SMITH, Summer  ??? ? ? ?Body Mass Index Check on??11/02/20.??Satisfied by St. Oleg LPN Je  ??? ? ? ?Colorectal Screening on??06/12/20.  ??? ? ? ?Depression Screening on??11/02/20.??Satisfied by St. Oleg LPN Je  ??? ? ? ?Diabetes Maintenance-Fasting Lipid Profile on??03/17/20.??Satisfied by Phyllis Arredondo  ??? ? ? ?Diabetes Maintenance-HgbA1c on??03/17/20.??Satisfied by Phyllis Arredondo  ??? ? ? ?Diabetes Maintenance-Medication Prescribed on??09/11/20.  ??? ? ? ?Diabetes Maintenance-Serum Creatinine on??03/17/20.??Satisfied by Phyllis Arredondo  ??? ? ? ?Diabetes Screening on??03/17/20.??Satisfied by Phyllis Arredondo  ??? ? ? ?Hypertension Management-Blood Pressure on??10/05/20.??Satisfied by Marco SMITH, Summer  ??? ? ? ?Hypertension Management-BMP on??03/17/20.??Satisfied by Phyllis Arredondo  ??? ? ? ?Lipid Screening on??03/17/20.??Satisfied by Phyllis Arredondo  ??? ? ? ?Obesity Screening on??11/02/20.??Satisfied by St. Oleg LPN, [place]  ??? ? ? ?Smoking Cessation on??07/27/20.??Satisfied by Delia Burnett??Reason: Expectation Satisfied Elsewhere  ?

## 2022-05-02 NOTE — HISTORICAL [facility] CERNER
This is a historical note converted from Cermaninder. Formatting and pictures may have been removed.  Please reference Perfecto for original formatting and attached multimedia. Chief Complaint  follow up , due Eye exam, flu shot  History of Present Illness  53 year old female here today for F/U. DM A1C at goal at 6.1 pt has been at goal for over 1 year. HTN at goal. Tobacco use pt smokes 1/2 pack a day. Pt has chronic UTI, FH bladder CA dad at later age in 70s. Pt reports numbness to side of left foot and lower back pain that has been worse the last 2 months. Pain in lower back is burning. Chronic UTI pt reports urine has odor in am with clear urine in the afternoon she reports less odor in urine since last visit. Pt does? not want Flu shot.  Review of Systems  All Systems Negative Except: See HPI  Physical Exam  Vitals & Measurements  T:?37.0? ?C (Oral)? HR:?82(Peripheral)? RR:?20? BP:?125/81?  HT:?170.0?cm? HT:?170.0?cm? WT:?119.111?kg?  General:? Alert and oriented, No acute distress, General health good  Eye:? Pupils are equal, round and reactive to light, Normal conjunctiva.?  HENT:? Normocephalic, Normal hearing, Oral mucosa is moist, No pharyngeal erythema.?  Neck:? Supple, Non-tender, No carotid bruit, No jugular venous distention, No lymphadenopathy, No thyromegaly.?  Respiratory:? Lungs are clear to auscultation, Respirations are non-labored, Breath sounds are equal, Symmetrical chest wall expansion.?  Cardiovascular:? Normal rate, Regular rhythm, No murmur, Good pulses equal in all extremities, Normal peripheral perfusion, No edema.?  Gastrointestinal:? Soft, Non-tender, Non-distended, Normal bowel sounds, No organomegaly.?  Musculoskeletal: Normal range of motion, Normal strength, No tenderness, No deformity, Normal gait.?  Integumentary:? Warm, Dry.?  Neurologic:? Alert, Oriented.?  Psychiatric:? Cooperative, Appropriate mood & affect.?  ?  ?  ?  Assessment/Plan  Diabetes?E13.9  A1C? 6.1 at goal  Continue  medications  On ACE, Statin according to guidelines  Follow ADA diet, avoid soda, simple sweets, and limit rice, breads and starches  Maintain healthy weight, exercise 4-5 times a week for 30?minutes  RTC?4 months?w labs  Ordered:  Clinic Follow up, *Est. 03/28/19 3:00:00 CDT, 4 month, Order for future visit, Tobacco user(  Probable Diagnosis  )  Diabetes, [facility]  Diabetes Eye Screen Request, 12/06/18 8:18:00 CST  ?  Hypertension?I10  Low sodium diet (Dash Diet)  Continue Medications as prescribed  Monitor Blood daily, report any consistent numbers greater than 140/90  Smoking cessation to reduce BP  Maintain healthy weight  Ordered:  Basic Metabolic Panel, Routine collect, *Est. 12/06/18 3:00:00 CST, Blood, Order for future visit, *Est. Stop date 12/06/18 3:00:00 CST, Lab Collect, Hypertension, 12/06/18 7:47:00 CST  ?  Sciatica?M54.30  X-ray lower back  Ordered:  XR Spine Lumbar 2 or 3 Views, Routine, *Est. 12/06/18 3:00:00 CST, Back Pain, Back pain, None, Ambulatory, Rad Type, Order for future visit, Sciatica, Not Scheduled, *Est. 12/06/18 3:00:00 CST  ?  Tobacco user(  Probable Diagnosis  )?Z72.0  Consider Cessation(not ready) Smokes 1?pack? a day  Consider decreasing to half a pack  Discussed benefits : Improved health, decreased cardiac risks, saving money  Re discuss cessation on F/U visit?  Ordered:  Clinic Follow up, *Est. 03/28/19 3:00:00 CDT, 4 month, Order for future visit, Tobacco user(  Probable Diagnosis  )  Diabetes, [facility]  Pathology Non-Gyn Request [facility], *Est. 12/06/18 3:00:00 CST, Routine, Order for future visit, AP Specimen, Urine, Hematuria, Nurse Collect, Print Label, RT - Routine, [id number], Hold Until Collected, FH: bladder cancer  Tobacco user(  Probable Diagnosis  ), *Est. 12/06/18 3:00:00 CST  ?  Wellness examination?Z00.00  Wellness labs today  Ordered:  CBC w/ Auto Diff, Routine collect, *Est. 12/06/18 3:00:00 CST, Blood, Order for future visit, *Est. Stop date  18 3:00:00 CST, Lab Collect, Wellness examination, 18 7:44:00 CST  Free T4, Routine collect, *Est. 18 3:00:00 CST, Blood, Order for future visit, *Est. Stop date 18 3:00:00 CST, Lab Collect, Wellness examination, 18 7:51:00 CST  T4, Routine collect, *Est. 18 3:00:00 CST, Blood, Order for future visit, *Est. Stop date 18 3:00:00 CST, Lab Collect, Wellness examination, 18 7:51:00 CST  Thyroid Stimulating Hormone, Routine collect, *Est. 18 3:00:00 CST, Blood, Order for future visit, *Est. Stop date 18 3:00:00 CST, Lab Collect, Wellness examination, 18 7:51:00 CST  ?  Orders:  loratadine, 10 mg = 1 tab(s), Oral, Daily, # 30 tab(s), 11 Refill(s), Pharmacy: [id number]  Urinalysis with Microscopic if Indicated, Routine collect, Urine, Order for future visit, *Est. 18 3:00:00 CST, *Est. Stop date 18 3:00:00 CST, Nurse collect, Chronic UTI  Urine Culture [id number], Routine collect, *Est. 18 3:00:00 CST, Order for future visit, Urine, Nurse collect, *Est. Stop date 18 3:00:00 CST, Chronic UTI   Problem List/Past Medical History  Ongoing  Anxiety  Diabetes  Hypercholesteremia  Hypertension  Morbid obesity(  Probable Diagnosis  )  Pain  Sinusitis  Swelling  Tobacco user  Tobacco user(  Probable Diagnosis  )  Historical  No qualifying data  Procedure/Surgical History  L kidney stone removal ()   section (1993)   section (1986)   section (1985)   x 3  left kidney stone removal  lithotripsy x 3  Tonsillectomy and adenoidectomy; age 12 or over   Medications  busPIRone 5 mg oral tablet, 5 mg= 1 tab(s), Oral, TID, 3 refills  Calcium 600+D, Oral, Daily  Lasix 20 mg oral tablet, 20 mg= 1 tab(s), Oral, Daily, PRN, 3 refills  lisinopril 40 mg oral tablet, 40 mg= 1 tab(s), Oral, Daily, 3 refills  loratadine 10 mg oral capsule, 10 mg= 1 cap(s), Oral, Daily, 5 refills  loratadine 10 mg  oral tablet, 10 mg= 1 tab(s), Oral, Daily, 11 refills  lovastatin 40 mg oral tablet, 40 mg= 1 tab(s), Oral, Daily, 3 refills  metformin 500 mg oral tablet, 500 mg= 1 tab(s), Oral, BID, 3 refills  Mobic 15 mg oral tablet, 15 mg= 1 tab(s), Oral, Daily, 3 refills  Allergies  codeine  naproxen  Social History  Alcohol  Current, Liquor, 1-2 times per year, 08/16/2017  Employment/School  Work/School description: homemaker. Operates hazardous equipment: No., 05/06/2016  Exercise  Self assessment: Fair condition., 05/06/2016  Home/Environment  Lives with Significant other. Living situation: Home/Independent. Alcohol abuse in household: No. Substance abuse in household: No. Smoker in household: Yes. Injuries/Abuse/Neglect in household: No. Feels unsafe at home: No. Family/Friends available for support: Yes. Concern for family members at home: No. Major illness in household: No. Financial concerns: No. TV/Computer concerns: No., 05/06/2016  Nutrition/Health  Regular, Wants to lose weight: No., 05/06/2016  Other  Sexual  Substance Abuse  Never, 12/04/2015  Tobacco  Current every day smoker Use:. Cigarettes Type:. 10 per day., 08/16/2018  Family History  Cancer: Father.  Cardiac arrest.: Mother.  Dialysis: Mother.  Hypertension.: Father.  Kidney disease: Mother.  Primary malignant neoplasm of bladder: Father.  Primary malignant neoplasm of prostate: Father.  Renal failure syndrome.: Mother.  Health Maintenance  Health Maintenance  ???Pending?(in the next year)  ??? ??OverDue  ??? ? ? ?Alcohol Misuse Screening due??08/16/18??and every 1??year(s)  ??? ? ? ?Diabetes Maintenance-Eye Exam due??10/18/18??and every 1??year(s)  ??? ??Due?  ??? ? ? ?ADL Screening due??12/06/18??and every 1??year(s)  ??? ? ? ?Influenza Vaccine due??12/06/18??and every?  ??? ? ? ?Tetanus Vaccine due??12/06/18??and every 10??year(s)  ??? ??Due In Future?  ??? ? ? ?Colorectal Screening not due until??02/16/19??and every 1??year(s)  ??? ? ? ?Diabetes  Maintenance-Fasting Lipid Profile not due until??02/19/19??and every 1??year(s)  ??? ? ? ?Diabetes Maintenance-Foot Exam not due until??04/19/19??and every 1??year(s)  ??? ? ? ?Diabetes Maintenance-Medication Prescribed not due until??04/19/19??and every 1??year(s)  ??? ? ? ?Diabetes Maintenance-HgbA1c not due until??08/15/19??and every 1??year(s)  ??? ? ? ?Hypertension Management-BMP not due until??08/15/19??and every 1??year(s)  ??? ? ? ?Diabetes Maintenance-Serum Creatinine not due until??08/15/19??and every 1??year(s)  ??? ? ? ?Diabetes Maintenance-Microalbumin not due until??08/15/19??and every 1??year(s)  ??? ? ? ?Blood Pressure Screening not due until??08/16/19??and every 1??year(s)  ??? ? ? ?Body Mass Index Check not due until??08/16/19??and every 1??year(s)  ??? ? ? ?Depression Screening not due until??08/16/19??and every 1??year(s)  ??? ? ? ?Hypertension Management-Blood Pressure not due until??08/16/19??and every 1??year(s)  ??? ? ? ?Obesity Screening not due until??08/16/19??and every 1??year(s)  ??? ? ? ?Smoking Cessation not due until??08/16/19??and every 1??year(s)  ??? ? ? ?Diabetes Maintenance-Urine Dipstick not due until??09/18/19??and every 1??year(s)  ???Satisfied?(in the past 1 year)  ??? ??Satisfied?  ??? ? ? ?Blood Pressure Screening on??08/16/18.??Satisfied by Anni Myers LPN  ??? ? ? ?Body Mass Index Check on??08/16/18.??Satisfied by Anni Myers LPN.  ??? ? ? ?Breast Cancer Screening on??09/18/18.??Satisfied by Nancy Osborne  ??? ? ? ?Cervical Cancer Screening on??05/17/18.??Satisfied by Renay Ness.  ??? ? ? ?Colorectal Screening on??02/16/18.??Satisfied by Divina Huffman  ??? ? ? ?Depression Screening on??08/16/18.??Satisfied by Anni Myers LPN.  ??? ? ? ?Diabetes Maintenance-Urine Dipstick on??09/18/18.??Satisfied by Phyllis Arredondo  ??? ? ? ?Diabetes Screening on??08/15/18.??Satisfied by Phyllis Arredondo  ??? ? ? ?Hypertension Management-Blood Pressure  on??08/16/18.??Satisfied by Anni Myers LPN  ??? ? ? ?Lipid Screening on??02/19/18.??Satisfied by Taylor Ramsey  ??? ? ? ?Obesity Screening on??08/16/18.??Satisfied by Anni Myers LPN  ??? ? ? ?Smoking Cessation on??08/16/18.??Satisfied by Anni Myers LPN  ?  ?

## 2022-05-02 NOTE — HISTORICAL [facility] CERNER
This is a historical note converted from Perfecto. Formatting and pictures may have been removed.  Please reference Perfecto for original formatting and attached multimedia. Chief Complaint  left ankle fx/5th toe  History of Present Illness  ?  55 Years?old ?RHD?Female?smoker?presents to?sports medicine clinic?for?initial evaluation?for left foot and ankle?trauma.  Patient is 2 weeks post-injury (DOI: 09/06/20).  Patient states she was walking in her neighbors backyard when her left?leg fell into a hole in the ground. Noted pain and swelling of her foot and ankle soon after. [place] [facility] [place] the day of injury where XRs showed a non-displaced?left lateral malleolus fracture and an?avulsion fracture of the base of the left?5th metatarsal. She was placed placed in?short leg posterior splint?and given crutches, which she swapped for a leg scooter on?09/11/20 for better mobility.?She?went to [facility] urgent care on 09/11/20 after being advised to do so by her PCP due to burning?pain?in her heel to rule out a pressure?ulcer. No ulcer was present, and pt declined new short leg posterior splint so she was fitted for a CAM boot. Pt has been compliant with the CAM boot, and is still using scooter to move. She is not currently taking any pain medications. Her pain is tolerable, but the swelling is still present. Her toes are also cool with some intermittent numbness.  JANET: Fell in hole.  Previously seen at [facility] and then [facility] urgent care.  Previous treatment:?placed in a splint and compliant with recommendations; CAM boot  previous injuries:?denies; hx of ankle twist but diagnosis unknown to pt  Current pain level: 3/10 ( rated as?mild)?without pain medication  associated symptoms:?intermittent, mild numbness and tinglingof left toes.;?swelling noted to dorsal aspect of left foot and lateral malleolus;?no skin changes;?weakness of left foot;?mild decrease in ROM  Current activity level: Works as a .  Family history:?non  contributory  ?  Review of Systems  Constitutional: no fever, no chills, no weight loss  CV: no swelling, no edema  GI: no urinary retention, no urinary incontinence  : no fecal incontinence  Skin: no rash, no wound  Neuro: no numbness/tingling, no weakness, no saddle anesthesia  MSK: as above  Psych: no depression, no anxiety  Heme/Lymph: no easy bruising, no easy bleeding, no lymphadenopathy  Immuno: no allergic reaction, no recurrent infections  Physical Exam  Vitals & Measurements  HR:?124(Peripheral)? BP:?127/70?  HT:?167.00?cm? WT:?134.700?kg? BMI:?48.3?  MSK Exam:?  left?ankle  Inspection:?swelling to the lateral malleolus ;?no erythema:?no bruising?  Palpation:?TTP at distal fibula  ROM:?mildly decreasedStrength:?mild decrease  ?  MSK Exam:?  left?foot  Inspection: swelling to the dorsal foot;?no erythema:?no bruising?  Palpation:?TTP at the base of the 5th metatarsal and 3rd toe,?  ROM:?mildly decreasedStrength:?mild decrease  Neurovascular:?mild decrease?sensation to light touch, pulses 1+, capillary refill?less than 2 seconds?Mildly cool distal extremity  ?  General: well developed;?well nourished; cooperative; obese; scooter and cam boot present; 1 crutch present  PSYCH: alert and oriented x 3?with?appropriate mood and affect  SKIN: inspection and palpation of skin and soft tissue normal; no scars noted on upper/lower extremities  CV: vascular integrity noted; +2 symmetrical pulses, no edema  NEURO: sensation intact by light touch; DTRs +2 bilateral and symmetrical  LYMPH: no LAD noted  Assessment/Plan  1.?Fracture of lateral malleolus of left ankle?S82.62XA  Dx: L 5th MT avulsion fx; L distal fibula fx Villagran A; old L medial Mall fx- non-op treatment  Plan:?Patient with initial conservative management at this time.discussed treatment options with patient.?  Rads:?imaging ordered and independently reviewed by me, discussed with patient, radiologist read pending?  Immobilization:?Continue CAM boot and  can use crutches next few days and wean off.  Rx:?OTC medication PRN, tylenol 1000 mg TID as needed or ibuprofen 400-800 mg ever 4-6 hours as needed.  Work-up:?no additional imaging needed at this time  Activity:?WBAT with CAM boot. When not moving, ankle ROM exercises.  RTC:?2 weeks?for re-evaluation, repeat XRs. If non-tender at lateral malleoli and base of 5th metatarsal, can be D/Theo from clinic.  ?  2.?Tobacco user?Z72.0  - Hand out provided for tips on smoking cessation  - Discussed impact of tobacco use on overall health?  ?   Obesity  - Discussed diet modifications and significant weight loss with portion control and food selection  - Goal is to lose 10% of the total body weight  Discussed with the resident at time of visit? Patient chart reviewed. Patient seen and evaluated at time of visit.?HPI, PE, and Assessment and Plan reviewed. Treatment plan is reasonable and appropriate.? All questions were answered.?Compliance with treatment plan is appropriate.  ?Radiology images independently reviewed and agree with radiologist. ?Radiology images independently reviewed and agree with resident.   Medications  acetaminophen-hydrocodone 325 mg-5 mg oral tablet, 1 tab(s), Oral, TID  Astelin 137 mcg/inh nasal spray, 1 spray(s), Nasal, BID, 11 refills  Calcium 600+D, Oral, Daily  Cranberry oral tablet  cyclobenzaprine 10 mg oral tablet, See Instructions,? ?Not Taking, Completed Rx  fluticasone 50 mcg/inh nasal spray, 2 spray(s), Nasal, At Bedtime, 11 refills  furosemide 20 mg oral tablet, See Instructions, 2 refills  furosemide 20 mg oral tablet, See Instructions,? ?Not Taking, Completed Rx  gabapentin 300 mg oral capsule, 300 mg= 1 cap(s), Oral, TID, 5 refills  lidocaine 0.5% topical gel, 1 abram, TOP, TID, 1 refills,? ?Not taking  lisinopril 40 mg oral tablet, 40 mg= 1 tab(s), Oral, Daily, 5 refills  loratadine 10 mg oral tablet, 10 mg= 1 tab(s), Oral, Daily, 3 refills  lovastatin 40 mg oral tablet, 40 mg= 1 tab(s),  Oral, Once a day (at bedtime), 3 refills  metFORMIN 1000 mg oral tablet, 1000 mg= 1 tab(s), Oral, BID, 5 refills  metformin 500 mg oral tablet, 500 mg= 1 tab(s), Oral, BID  Mobic 15 mg oral tablet, 15 mg= 1 tab(s), Oral, Daily, 3 refills  MoviPrep oral powder for reconstitution  Vitamin B12  Diagnostic Results  XRs viewed by me: Non-displaced distal?fibula fx. Well circumscribed avulsion fracture of distal left medial mall- appears chronic. Soft tissue swelling.? Avulsion fracture of base of left?5th metatarsal, Sparkle COLLINS.

## 2022-05-02 NOTE — HISTORICAL [facility] CERNER
This is a historical note converted from Perfecto. Formatting and pictures may have been removed.  Please reference Perfecto for original formatting and attached multimedia. Chief Complaint  Positive FIT  History of Present Illness  55-year-old woman with history of HTN, DM, HLD, anxiety, morbid obesity?who presents due to positive FIT.? She has never had a colonoscopy in the past. ?She denies any nausea, vomiting, hemoptysis, weight loss,?family history of colon cancer.? No history of stroke?or heart attack. ?No blood thinners.  She notices?hematochezia?secondary to hemorrhoids occasionally. ?No melena.  Surgical history:  x3  Review of Systems  Negative except for stated in HPI  Physical Exam  General: No acute distress  Prevascular: Regular rate  Respiratory: Normal work of breathing, no shortness of breath  Abdomen: Soft, nontender, nondistended,  scar is well-healed  Assessment/Plan  55-year-old woman here for screening colonoscopy  ?  -Colonoscopy today  -Written consent obtained and placed in the patients chart  ?  Edgardo Gonzalez MD  Surgery, PGY-II   Problem List/Past Medical History  Ongoing  Anxiety  BMI 40.0-44.9, adult  Diabetes  Frequent UTI  Hypercholesteremia  Hypertension  Left knee pain  Morbid obesity(  Probable Diagnosis  )  Osteoarthritis cervical spine  Osteoarthritis of lumbar spine  Pain  Sinusitis  Swelling  Tobacco user  Tobacco user(  Probable Diagnosis  )  Historical  No qualifying data  Procedure/Surgical History  Injection(s); single or multiple trigger point(s), 1 or 2 muscle(s) (2019)  Introduction of Anesthetic Agent into Muscle, Percutaneous Approach (2019)  L kidney stone removal ()   section (1993)   section (1986)   section (1985)   x 3  left kidney stone removal  lithotripsy x 3  Tonsillectomy and adenoidectomy; age 12 or over   Medications  Inpatient  No active inpatient medications  Home  Calcium  600+D, Oral, Daily  Cranberry oral tablet  cyclobenzaprine 10 mg oral tablet, See Instructions  furosemide 20 mg oral tablet, See Instructions, 2 refills  furosemide 20 mg oral tablet, See Instructions  gabapentin 300 mg oral capsule, 300 mg= 1 cap(s), Oral, TID, 5 refills  lidocaine 0.5% topical gel, 1 abram, TOP, TID, 1 refills,? ?Not taking  lisinopril 40 mg oral tablet, 40 mg= 1 tab(s), Oral, Daily, 5 refills  loratadine 10 mg oral tablet, 10 mg= 1 tab(s), Oral, Daily, 3 refills  lovastatin 40 mg oral tablet, 40 mg= 1 tab(s), Oral, Once a day (at bedtime), 3 refills  metFORMIN 1000 mg oral tablet, 1000 mg= 1 tab(s), Oral, BID, 5 refills  Mobic 15 mg oral tablet, 15 mg= 1 tab(s), Oral, Daily, 3 refills  Vitamin B12  Allergies  codeine  naproxen  Social History  Abuse/Neglect  No, No, Yes, 06/09/2020  Alcohol  Current, Liquor, 1-2 times per year, 09/04/2019  Employment/School  Work/School description: homemaker. Operates hazardous equipment: No., 05/06/2016  Exercise  Self assessment: Fair condition., 05/06/2016  Home/Environment  Lives with Significant other. Living situation: Home/Independent. Alcohol abuse in household: No. Substance abuse in household: No. Smoker in household: Yes. Injuries/Abuse/Neglect in household: No. Feels unsafe at home: No. Family/Friends available for support: Yes. Concern for family members at home: No. Major illness in household: No. Financial concerns: No. TV/Computer concerns: No., 05/06/2016  Nutrition/Health  Regular, Wants to lose weight: No., 05/06/2016  Other  Sexual  Substance Use  Never, 12/04/2015  Tobacco  10 or more cigarettes (1/2 pack or more)/day in last 30 days, No, 06/09/2020  Family History  Cancer: Father.  Cardiac arrest.: Mother.  Dialysis: Mother.  Hypertension.: Father.  Kidney disease: Mother.  Primary malignant neoplasm of bladder: Father.  Primary malignant neoplasm of prostate: Father.  Renal failure syndrome.: Mother.  Immunizations  Vaccine Date Status    tetanus/diphtheria/pertussis, acel(Tdap) 04/04/2019 Given   measles/mumps/rubella virus vaccine 05/07/2002 Recorded   hepatitis B pediatric vaccine 05/07/2002 Recorded

## 2022-05-02 NOTE — HISTORICAL [facility] CERNER
This is a historical note converted from Cermaninder. Formatting and pictures may have been removed.  Please reference Cermaninder for original formatting and attached multimedia. Chief Complaint  follow up , labs done this morning ...reports urine with strong odor, UA done this morning in lab...ct  History of Present Illness  54 year old female here today for F/U. HTN low today no previous hx of low Blood pressures pt denies orthostatic hypotension. DM at goal. Pt reports dysuria. Hyperlipidemia total cholesterol 139 HDL 40 triglycerides 190 LDL 60. ?Tobacco use patient smokes a half a pack?a day and does not desire cessation at this time.? Patient has a history of a chronic UTI and reports that recently in the last 2 weeks she has had a very strong odor to urine and dysuria.? Sinusitis patient takes Claritin daily reports?increased?sinus symptoms with local spring and high pollen counts.  Review of Systems  All Systems Negative Except: See HPI  Physical Exam  Vitals & Measurements  T:?6.9? ?C (Oral)? HR:?81(Peripheral)? RR:?18? BP:?94/61?  HT:?167.0?cm? WT:?124.0?kg? BMI:?44.46?  General:? Alert and oriented, No acute distress, General health good  Eye:? Pupils are equal, round and reactive to light, Normal conjunctiva.?  HENT:? Normocephalic, Normal hearing, Oral mucosa is moist, No pharyngeal erythema.?  Neck:? Supple, Non-tender, No carotid bruit, No jugular venous distention, No lymphadenopathy, No thyromegaly.?  Respiratory:? Lungs are clear to auscultation, Respirations are non-labored, Breath sounds are equal, Symmetrical chest wall expansion.?  Cardiovascular:? Normal rate, Regular rhythm, No murmur, Good pulses equal in all extremities, Normal peripheral perfusion, No edema.?  Gastrointestinal:? Soft, Non-tender, Non-distended, Normal bowel sounds, No organomegaly.?  Musculoskeletal: Normal range of motion, Normal strength, No tenderness, No deformity, Normal gait.?  Integumentary:? Warm, Dry.?  Neurologic:? Alert,  Oriented.?  Psychiatric:? Cooperative, Appropriate mood & affect.?  ?  ?  ?  Assessment/Plan  Colon cancer screening  Fecal occult today  Ordered:  Occult Blood Fecal Immunoassay, Routine collect, Stool, Order for future visit, *Est. 04/18/19 3:00:00 CDT, *Est. Stop date 04/18/19 3:00:00 CDT, Nurse collect, Colon cancer screening  ?  Diabetes  A1C?pending  Continue medications  On ACE, Statin according to guidelines  Follow ADA diet, avoid soda, simple sweets, and limit rice, breads and starches  Maintain healthy weight, exercise 4-5 times a week for 30?minutes  Foot exam?today  Eye exam?January Advil plaque clinic  RTC 3 months?  Ordered:  Clinic Follow up, *Est. 08/04/19 3:00:00 CDT, 4 month F/U, Order for future visit, Diabetes, [facility]  Request Eye Exam Results, 04/04/19 7:55:00 CDT, [facility]  ?  Dysuria  ?Urine and culture today Hx UTI chronic  Ordered:  Urinalysis with Microscopic if Indicated, Routine collect, Urine, Order for future visit, *Est. 04/04/19 3:00:00 CDT, *Est. Stop date 04/04/19 3:00:00 CDT, Nurse collect, Dysuria  Urine Culture [id number], Routine collect, *Est. 04/04/19 3:00:00 CDT, Order for future visit, Urine, Nurse collect, *Est. Stop date 04/04/19 3:00:00 CDT, Dysuria  ?  Edema  ?Use Lasix as needed  Ordered:  furosemide, 20 mg = 1 tab(s), Oral, Daily, PRN PRN edema, # 10 tab(s), 3 Refill(s), Pharmacy: [id number]  ?  HLD (hyperlipidemia)  total cholesterol 139 HDL 40 triglycerides 190 LDL 60.  Continue Statin at night / Continue diet modifications  Follow a?low cholesterol, low saturated fat diet with less that 200mg of cholesterol a day  Avoid Fried foods and high saturated fats (high saturated fats less than 7% of calories)  Add flax seed or Fish oil supplements?to diet, if not DM eat?a bowl of oatmeal or high fiber cereal for?breakfast  Regular exercise can reduce LDL and raise HDL, reduce weight to help lower cholesterol?  Ordered:  lovastatin, 40 mg = 1  tab(s), Oral, Daily, # 90 tab(s), 3 Refill(s), Pharmacy: [id number]  ?  Hypertension  Low sodium diet (Dash Diet)  Continue Medications as prescribed  Monitor Blood daily, report any consistent numbers greater than 140/90  Maintain healthy weight  Ordered:  lisinopril, 40 mg = 1 tab(s), Oral, Daily, # 90 tab(s), 3 Refill(s), Pharmacy: [id number]  ?  Need for tetanus booster  ?Tetanus shot today  Ordered:  tetanus/diphth/pertuss (Tdap) adult/adol, 0.5 mL, form: Injection, IM, Once-Unscheduled, first dose 19 7:45:00 CDT  ?  Sinusitis  use Claritin as needed  Ordered:  loratadine, 10 mg = 1 tab(s), Oral, Daily, # 30 tab(s), 11 Refill(s), Pharmacy: [facility]  ?  Tobacco user(  Probable Diagnosis  )  Consider Cessation(not ready) Smokes half?pack? a day  Consider decreasing to?one fourth a pack  Discussed benefits : Improved health, decreased cardiac risks, saving money  Re discuss cessation on F/U visit?  ?  Orders:  cyclobenzaprine, 10 mg = 1 tab(s), Oral, At Bedtime, PRN PRN for spasm, # 30 tab(s), 1 Refill(s), Pharmacy: [facility]  gabapentin, 300 mg = 1 cap(s), Oral, TID, # 270 cap(s), 3 Refill(s), Pharmacy: [facility]  meloxicam, 15 mg = 1 tab(s), Oral, Daily, # 90 tab(s), 3 Refill(s), Pharmacy: [facility]   Problem List/Past Medical History  Ongoing  Anxiety  Diabetes  Hypercholesteremia  Hypertension  Morbid obesity(  Probable Diagnosis  )  Pain  Sinusitis  Swelling  Tobacco user  Tobacco user(  Probable Diagnosis  )  Historical  No qualifying data  Procedure/Surgical History  L kidney stone removal ()   section (1993)   section (1986)   section (1985)   x 3  left kidney stone removal  lithotripsy x 3  Tonsillectomy and adenoidectomy; age 12 or over   Medications  Boostrix (Tdap), 0.5 mL, IM, Once-Unscheduled  Calcium 600+D, Oral, Daily  gabapentin 300 mg oral capsule, 300 mg= 1 cap(s),  Oral, TID, 3 refills  Lasix 20 mg oral tablet, 20 mg= 1 tab(s), Oral, Daily, PRN, 3 refills  lisinopril 40 mg oral tablet, 40 mg= 1 tab(s), Oral, Daily, 3 refills  loratadine 10 mg oral tablet, 10 mg= 1 tab(s), Oral, Daily, 11 refills  lovastatin 40 mg oral tablet, 40 mg= 1 tab(s), Oral, Daily, 3 refills  metformin 500 mg oral tablet, 500 mg= 1 tab(s), Oral, BID, 3 refills  Mobic 15 mg oral tablet, 15 mg= 1 tab(s), Oral, Daily, 3 refills  Allergies  codeine  naproxen  Social History  Alcohol  Current, Liquor, 1-2 times per year, 08/16/2017  Employment/School  Work/School description: homemaker. Operates hazardous equipment: No., 05/06/2016  Exercise  Self assessment: Fair condition., 05/06/2016  Home/Environment  Lives with Significant other. Living situation: Home/Independent. Alcohol abuse in household: No. Substance abuse in household: No. Smoker in household: Yes. Injuries/Abuse/Neglect in household: No. Feels unsafe at home: No. Family/Friends available for support: Yes. Concern for family members at home: No. Major illness in household: No. Financial concerns: No. TV/Computer concerns: No., 05/06/2016  Nutrition/Health  Regular, Wants to lose weight: No., 05/06/2016  Other  Sexual  Substance Abuse  Never, 12/04/2015  Tobacco  10 or more cigarettes (1/2 pack or more)/day in last 30 days, N/A, 04/04/2019  Family History  Cancer: Father.  Cardiac arrest.: Mother.  Dialysis: Mother.  Hypertension.: Father.  Kidney disease: Mother.  Primary malignant neoplasm of bladder: Father.  Primary malignant neoplasm of prostate: Father.  Renal failure syndrome.: Mother.  Health Maintenance  Health Maintenance  ???Pending?(in the next year)  ??? ??OverDue  ??? ? ? ?Alcohol Misuse Screening due??08/16/18??and every 1??year(s)  ??? ? ? ?Diabetes Maintenance-Eye Exam due??10/18/18??and every 1??year(s)  ??? ??Due?  ??? ? ? ?Colorectal Screening due??02/16/19??and every 1??year(s)  ??? ? ? ?Tetanus Vaccine due??04/04/19??and  every 10??year(s)  ??? ??Due In Future?  ??? ? ? ?Diabetes Maintenance-Foot Exam not due until??04/19/19??and every 1??year(s)  ??? ? ? ?Diabetes Maintenance-Medication Prescribed not due until??04/19/19??and every 1??year(s)  ??? ? ? ?Diabetes Maintenance-HgbA1c not due until??08/15/19??and every 1??year(s)  ??? ? ? ?Diabetes Maintenance-Microalbumin not due until??08/15/19??and every 1??year(s)  ??? ? ? ?Smoking Cessation not due until??08/16/19??and every 1??year(s)  ??? ? ? ?Depression Screening not due until??12/06/19??and every 1??year(s)  ??? ? ? ?Diabetes Maintenance-Urine Dipstick not due until??12/06/19??and every 1??year(s)  ??? ? ? ?Blood Pressure Screening not due until??04/03/20??and every 1??year(s)  ??? ? ? ?Body Mass Index Check not due until??04/03/20??and every 1??year(s)  ??? ? ? ?Diabetes Maintenance-Fasting Lipid Profile not due until??04/03/20??and every 1??year(s)  ??? ? ? ?Hypertension Management-BMP not due until??04/03/20??and every 1??year(s)  ??? ? ? ?Hypertension Management-Blood Pressure not due until??04/03/20??and every 1??year(s)  ???Satisfied?(in the past 1 year)  ??? ??Satisfied?  ??? ? ? ?ADL Screening on??04/04/19.??Satisfied by Clare Phelps LPN  ??? ? ? ?Blood Pressure Screening on??04/04/19.??Satisfied by Clare Phelps LPN  ??? ? ? ?Body Mass Index Check on??04/04/19.??Satisfied by Clare Phelps LPN  ??? ? ? ?Breast Cancer Screening on??09/18/18.??Satisfied by Nancy Osborne  ??? ? ? ?Cervical Cancer Screening on??05/17/18.??Satisfied by Renay Ness.  ??? ? ? ?Depression Screening on??12/06/18.??Satisfied by Clare Phelps LPN  ??? ? ? ?Diabetes Maintenance-Serum Creatinine on??04/04/19.??Satisfied by Mike Hudson Jr.  ??? ? ? ?Diabetes Screening on??04/04/19.??Satisfied by Mike Hudson Jr.  ??? ? ? ?Hypertension Management-Blood Pressure on??04/04/19.??Satisfied by Clare Phelps LPN  ??? ? ? ?Influenza Vaccine  on??12/06/18.??Satisfied by Clare Phelps LPN  ??? ? ? ?Lipid Screening on??04/04/19.??Satisfied by Mike Hudson Jr.  ??? ? ? ?Obesity Screening on??04/04/19.??Satisfied by Clare Phelps LPN  ??? ? ? ?Smoking Cessation on??08/16/18.??Satisfied by Anni Myers LPN  ?  ?

## 2022-05-02 NOTE — HISTORICAL [facility] CERNER
This is a historical note converted from Perfecto. Formatting and pictures may have been removed.  Please reference Cermaninder for original formatting and attached multimedia. Chief Complaint  had Fall by walking off curb ?in July and injuried left knee  History of Present Illness  54?yo White female being seen in clinic for follow-up, medication reconciliation, and lab review (4/4/2019).? Hx includes HTN (eGFR <70, with Cr .9 on 4/4/19), Type 2 diabetes (6.2% on 4/4/19), hyperlipidemia, frequent UTIs, anxiety, morbid obesity, chronic rhinitis, and smoking.? Still working in [employer] in [place].? Lives in [place].? Unable to go to physical therapy because she works 5 days/week.?  ?   Today 9/4/2019:? In her usual state of health today.? States she is still having chronic pain between shoulder blades; pain is more burning than aching pain.? Pain in lower back continues.? Current pain in shoulders is 4-5/10.? No pain in lower back, while sitting.? Fingers are numb.? States she does not want to consider surgical intervention at this time.? Not wearing wrist splints at night.? Fell in mid-July at work?and and landed on both knees.? Left knee continues to hurt.? Did not go to ER for evaluation.? Current level of pain is 0/10, with sitting.? With walking, pain increases to 4-5/10.? Pain is centered around bottom of knee cap.? If she puts pressure on knee, pain gets to as high as 7-8/10.? Had no problems with knees prior to July fall.? States she is not sleeping as well as she would like.? However, when she takes Flexeril, does sleep through the night.? Denies chest pain, shortness of breath,?cough, fever, headache,?dizziness, weakness, abdominal pain, nausea,?vomiting, diarrhea, constipation, dysuria, depression, anxiety, SI/HI.? MMG scheduled for 9/20/2019.? Gyn wellness visit scheduled for 1/2020.? Has not heard from GI procedure clinic yet to schedule colonoscopy for +FIT in April 2019.  ?    Imaging:  Mammogram done 9/18/2018 demonstrated no suspicious findings for malignancy.? Routine follow-up annual screening in 1 year.  ?  X-ray of the lumbar spine done 12/6/2018 demonstrated grade 1 anterior listhesis of L4 on L5 which measures 5 mm; normal vertebral body heart heights are maintained; no aggressive lytic or blastic osseous lesion is identified.? The pedicles are intact.? There is disc space narrowing and mild ventral osteophytic spurring along the lumbar spine with facet degenerative changes of the lower lumbar spine.  ?  X-ray of the cervical spine done 1/31/2019 showed degenerative disc space narrowing at C6-7, and additional anterior osteophyte formation from C4-C7.? Vertebral body heights are maintained.  ?  X-ray of the right foot for pain done 4/28/2019 demonstrated calcaneal spurs; no acute bony abnormalities noted.  ?  Labs:  BMI:? 43.81  Lipid Panel on 4/4/19: Chol 139, HDL 40, Trig 190, LDL 61  TSH and free T4 normal 12/6/2018  Micro ACR 59.5 grams protein on 4/4/2019  CBC unremarkable on 12/6/2018  FIT Positive 4/4/2019  Urine with bacteria, blood, and esterase on 4/4/2019  No infectious screenings on chart  ?  ?  Review of Systems  Except as stated in HPI, all other 10 body systems normal  ?  Physical Exam  Vitals & Measurements  T:?37.2? ?C (Oral)? HR:?94(Peripheral)? RR:?18? BP:?116/84?  HT:?170?cm? WT:?126.6?kg? BMI:?43.81?  General:??VSS; afebrile.??Morbidly obese; in no acute distress  Eyes: EOMI, clear conjunctiva, eyelids normal  Mouth:??tongue midline, pink, and moist; no lesions or erythema in oral cavity, dentition fair  Neck: full range of motion, no thyromegaly or lymphadenopathy  Respiratory:?clear to auscultation anteriorly and posteriorly; diminished over anterior bases bilaterally; no cough.??  Cardiovascular:?regular rate and rhythm without murmurs, gallops or rubs.??No peripheral edema.??No hemosiderin staining or varicosities.? Generalized lipidema in arms and legs.  Scattered hair along lower legs.?  Gastrointestinal:?obese abdomen, soft, non-tender, with normal bowel sounds, without masses to palpation  Genitourinary: deferred  Musculoskeletal:?Point tenderness of left knee joint along lateral, distal, and medial areas.? No erythema, warmth, or fluid.? Full extension with crepitus felt at joint.? Full range of motion of all other extremities; no joint deformities or edema  Integumentary: no rashes or skin lesions present  Neurologic: A&O X 3; cranial nerves intact, no signs of peripheral neurological deficit, motor/sensory function intact  Psychiatric:?Calm, cooperative.??Mood and affect normal.??No s/s of depression or anxiety.??Responses appropriate  ?  Assessment/Plan  1.?Left knee pain?M25.562  4-view XR of left knee today.? Discussed outpatient PT with her; she states she cannot take off work to do that.? She is already on Mobic and Flexeril.? Instructed her to avoid OTC NSAID-like medications to avoid increases in B/P and renal complications.? Referral to Orthopedic Clinic for evaluation of joint injections.? Handouts provided.  Ordered:  Internal Referral to Orthopedics, Left knee pain, following July fall, *Est. 10/04/19 3:00:00 CDT, Future Visit?, Left knee pain  XR Knee Left 4 or More Views, Routine, 09/04/19 11:35:00 CDT, Fall, None, Ambulatory, Rad Type, Left knee pain, Not Scheduled, 09/04/19 11:35:00 CDT  ?  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 She does not want to have EMG at this time, due to work schedule.? Instructed her to trial wrist splits at bedtime.? Instructed she can increase Gabapentin to 2 capsules at bedtime, as she is only taking 1 capsule twice daily, currently.? Again, she made it clear that she does not want to consider surgical intervention for neck, shoulder, and back issues today.? Will continue to  monitor.  Ordered:  gabapentin, 300 mg = 1 cap(s), Oral, TID, Take 1 in the morning. Can take 2 at bedtime., # 270 cap(s), 5 Refill(s), Pharmacy: [id number]  ?  3.?Diabetes?E11.9  Handouts given today.? Fundus exam done today  HgbA1C today.? Continue current Metformin, pending labs.? Teaching provided on etiology of disease process, complications of diabetes, goal HgbA1C, normal, low, and high CBGs.? Teaching provided on foods high in carbs and how that impairs diabetes.? Teaching provided to avoid sugary drinks, and substitute with water.??  Ordered:  metFORMIN, 500 mg = 1 tab(s), Oral, BID, Take 1 tab twice daily for diabetes, # 180 tab(s), 3 Refill(s), Pharmacy: [facility]  2280F- Medication reconciliation completed during visit, Diabetes  Hypercholesteremia  Hypertension  Frequent UTI  Tobacco user(  Probable Diagnosis  ), [facility], 09/04/19 11:08:00 CDT  Clinic Follow up, *Est. 03/05/20 7:00:00 CST, Order for future visit, Tobacco user(  Probable Diagnosis  ), [facility]  Comprehensive Metabolic Panel, Routine collect, *Est. 03/04/20 3:00:00 CST, Blood, Order for future visit, *Est. Stop date 03/04/20 3:00:00 CST, Lab Collect, Hypertension  Diabetes, 09/04/19 11:35:00 CDT  Hemoglobin A1C [facility], Routine collect, *Est. 03/04/20 3:00:00 CST, Blood, Order for future visit, *Est. Stop date 03/04/20 3:00:00 CST, Lab Collect, Diabetes, 09/04/19 11:36:00 CDT  Lipid Panel, Routine collect, *Est. 03/04/20 3:00:00 CST, Blood, Order for future visit, *Est. Stop date 03/04/20 3:00:00 CST, Lab Collect, Wellness examination  BMI 40.0-44.9, adult  Diabetes  Hypertension, 09/04/19 11:36:00 CDT  Office/Outpatient Visit Level 4 Established 41312 PC, Diabetes  Hypercholesteremia  Hypertension  Frequent UTI  Tobacco user(  Probable Diagnosis  ), [facility], 09/04/19 11:08:00 CDT  ?  4.?Hypercholesteremia?E78.00  At goal last check.? Repeat lipid panel in 6 months.? Continue current  dosage of Lovastatin.? Handouts given today.? Teaching provided on normal cholesterol, HDL, LDL, and triglyceride levels, along with complications associated with abnormalities.? Teaching provided on weight loss, dietary changes, exercise, and statins in controlling lipids.??  ?  Ordered:  1160F- Medication reconciliation completed during visit, Diabetes  Hypercholesteremia  Hypertension  Frequent UTI  Tobacco user(  Probable Diagnosis  ), [facility], 09/04/19 11:08:00 CDT  Clinic Follow up, *Est. 03/05/20 7:00:00 CST, Order for future visit, Tobacco user(  Probable Diagnosis  ), [facility]  Office/Outpatient Visit Level 4 Established 43484 PC, Diabetes  Hypercholesteremia  Hypertension  Frequent UTI  Tobacco user(  Probable Diagnosis  ), [facility], 09/04/19 11:08:00 CDT  ?  5.?Hypertension?I10,?Hypertension?I10  At goal; CPM.? Repeat CMP in 6 months.? Teaching? provided on goal B/P readings to prevent end-organ complications, low sodium diet principles, and current anti-hypertensive regimen.?  Ordered:  lisinopril, 40 mg = 1 tab(s), Oral, Daily, # 90 tab(s), 5 Refill(s), Pharmacy: [id number]  1160F- Medication reconciliation completed during visit, Diabetes  Hypercholesteremia  Hypertension  Frequent UTI  Tobacco user(  Probable Diagnosis  ), [facility], 09/04/19 11:08:00 CDT  Clinic Follow up, *Est. 03/05/20 7:00:00 CST, Order for future visit, Tobacco user(  Probable Diagnosis  ), [facility]  Comprehensive Metabolic Panel, Routine collect, *Est. 03/04/20 3:00:00 CST, Blood, Order for future visit, *Est. Stop date 03/04/20 3:00:00 CST, Lab Collect, Hypertension  Diabetes, 09/04/19 11:35:00 CDT  Lipid Panel, Routine collect, *Est. 03/04/20 3:00:00 CST, Blood, Order for future visit, *Est. Stop date 03/04/20 3:00:00 CST, Lab Collect, Wellness examination  BMI 40.0-44.9, adult  Diabetes  Hypertension, 09/04/19 11:36:00 CDT  Office/Outpatient Visit Level 4  Established 61868 PC, Diabetes  Hypercholesteremia  Hypertension  Frequent UTI  Tobacco user(  Probable Diagnosis  ), [facility], 09/04/19 11:08:00 CDT  ?  6.?Frequent UTI?N39.0  Handouts given today.  UA ordered today.  Ordered:  1160F- Medication reconciliation completed during visit, Diabetes  Hypercholesteremia  Hypertension  Frequent UTI  Tobacco user(  Probable Diagnosis  ), [facility], 09/04/19 11:08:00 CDT  Clinic Follow up, *Est. 03/05/20 7:00:00 CST, Order for future visit, Tobacco user(  Probable Diagnosis  ), [facility]  Office/Outpatient Visit Level 4 Established 93823 PC, Diabetes  Hypercholesteremia  Hypertension  Frequent UTI  Tobacco user(  Probable Diagnosis  ), [facility], 09/04/19 11:08:00 CDT  ?  7.?Tobacco user(  Probable Diagnosis  )?Z72.0  Handouts given today.  Does not want to stop smoking today.? Smoking cessation counseling provided, with emphasis provided on long-term risks and complications associated with smoking.? Instructed on community resources available to her, smoking cessation program through [facility], and pharmacological interventions to aid in cessation.? Instructed on importance of setting a stop date; and, that it takes, on average 4 to 7 times for complete success in cessation.??  Ordered:  1160F- Medication reconciliation completed during visit, Diabetes  Hypercholesteremia  Hypertension  Frequent UTI  Tobacco user(  Probable Diagnosis  ), [facility], 09/04/19 11:08:00 CDT  Clinic Follow up, *Est. 03/05/20 7:00:00 CST, Order for future visit, Tobacco user(  Probable Diagnosis  ), [facility]  Office/Outpatient Visit Level 4 Established 57130 PC, Diabetes  Hypercholesteremia  Hypertension  Frequent UTI  Tobacco user(  Probable Diagnosis  ), [facility], 09/04/19 11:08:00 CDT  ?  8.?Osteoarthritis of lumbar spine?M47.816  Handouts given today.  Discussed outpatient PT with her.? She does not want to consider surgery.? No need  for MRI at this time.? Will continue to monitor.? Long discussion regarding weight and consequences on body joints, including back.  ?  9.?Osteoarthritis cervical spine?M47.812  As above.  Ordered:  cyclobenzaprine, 10 mg = 1 tab(s), Oral, TID, # 90 tab(s), 2 Refill(s), Pharmacy: [facility]  meloxicam, 15 mg = 1 tab(s), Oral, Daily, # 90 tab(s), 3 Refill(s), Pharmacy: [facility]  ?  10.?Anxiety?F41.9  Stable; not on any medications.? Will cont to monitor.  ?  11.?BMI 40.0-44.9, adult?Z68.41  Handouts given today.  Reviewed goal BMI with her BMI today.? Teaching provided on long-term complications associated with obesity, weight loss measures, increasing physical activity, improving diet, avoiding sugary foods and liquids, avoiding processed and fast foods, and increasing vegetables and fruits.?  Ordered:  Lipid Panel, Routine collect, *Est. 03/04/20 3:00:00 CST, Blood, Order for future visit, *Est. Stop date 03/04/20 3:00:00 CST, Lab Collect, Wellness examination  BMI 40.0-44.9, adult  Diabetes  Hypertension, 09/04/19 11:36:00 CDT  ?  Orders:  furosemide, 20 mg = 1 tab(s), Oral, Daily, PRN PRN edema, take 1 tab as needed daily for swelling in feet, # 30 tab(s), 1 Refill(s), Pharmacy: [facility]  loratadine, 10 mg = 1 tab(s), Oral, Daily, take 1 tab daily for sinuses, # 90 tab(s), 3 Refill(s), Pharmacy: [facility]  lovastatin, 40 mg = 1 tab(s), Oral, Once a day (at bedtime), Take 1 tab nightly for cholesterol, # 90 tab(s), 3 Refill(s), Pharmacy: [facility]  Referrals  Internal Referral to [facility], diabetes, 09/04/19 11:22:28 CDT, Diabetes  Internal Referral to Orthopedics, Left knee pain, following July fall, *Est. 10/04/19 3:00:00 CDT, Future Visit?, Left knee pain  Clinic Follow up, *Est. 03/05/20 7:00:00 CST, Order for future visit, Tobacco user(  Probable Diagnosis  ), [facility]  Reviewed most recent labs and imaging studies with  patient; questions answered; voiced understanding of all teaching today.   Problem List/Past Medical History  Ongoing  Anxiety  BMI 40.0-44.9, adult  Diabetes  Frequent UTI  Hypercholesteremia  Hypertension  Left knee pain  Morbid obesity(  Probable Diagnosis  )  Osteoarthritis cervical spine  Osteoarthritis of lumbar spine  Pain  Sinusitis  Swelling  Tobacco user  Tobacco user(  Probable Diagnosis  )  Historical  No qualifying data  Procedure/Surgical History  L kidney stone removal ()   section (1993)   section (1986)   section (1985)   x 3  left kidney stone removal  lithotripsy x 3  Tonsillectomy and adenoidectomy; age 12 or over   Medications  Calcium 600+D, Oral, Daily  cyclobenzaprine 10 mg oral tablet, 10 mg= 1 tab(s), Oral, TID, 2 refills  gabapentin 300 mg oral capsule, 300 mg= 1 cap(s), Oral, TID, 5 refills  Lasix 20 mg oral tablet, 20 mg= 1 tab(s), Oral, Daily, PRN, 1 refills  lisinopril 40 mg oral tablet, 40 mg= 1 tab(s), Oral, Daily, 5 refills  loratadine 10 mg oral tablet, 10 mg= 1 tab(s), Oral, Daily, 3 refills  lovastatin 40 mg oral tablet, 40 mg= 1 tab(s), Oral, Once a day (at bedtime), 3 refills  metformin 500 mg oral tablet, 500 mg= 1 tab(s), Oral, BID, 3 refills  Mobic 15 mg oral tablet, 15 mg= 1 tab(s), Oral, Daily, 3 refills  Allergies  codeine  naproxen  Social History  Abuse/Neglect  No, No, Yes, 2019  Alcohol  Current, Liquor, 1-2 times per year, 2019  Employment/School  Work/School description: homemaker. Operates hazardous equipment: No., 2016  Exercise  Self assessment: Fair condition., 2016  Home/Environment  Lives with Significant other. Living situation: Home/Independent. Alcohol abuse in household: No. Substance abuse in household: No. Smoker in household: Yes. Injuries/Abuse/Neglect in household: No. Feels unsafe at home: No. Family/Friends available for support: Yes. Concern for family members at  home: No. Major illness in household: No. Financial concerns: No. TV/Computer concerns: No., 05/06/2016  Nutrition/Health  Regular, Wants to lose weight: No., 05/06/2016  Other  Sexual  Substance Use  Never, 12/04/2015  Tobacco  10 or more cigarettes (1/2 pack or more)/day in last 30 days, No, 09/04/2019  Family History  Cancer: Father.  Cardiac arrest.: Mother.  Dialysis: Mother.  Hypertension.: Father.  Kidney disease: Mother.  Primary malignant neoplasm of bladder: Father.  Primary malignant neoplasm of prostate: Father.  Renal failure syndrome.: Mother.  Immunizations  Vaccine Date Status   tetanus/diphtheria/pertussis, acel(Tdap) 04/04/2019 Given   measles/mumps/rubella virus vaccine 05/07/2002 Recorded   hepatitis B pediatric vaccine 05/07/2002 Recorded   Health Maintenance  Health Maintenance  ???Pending?(in the next year)  ??? ??OverDue  ??? ? ? ?Diabetes Maintenance-Eye Exam due??10/18/18??and every 1??year(s)  ??? ??Due?  ??? ? ? ?Influenza Vaccine due??09/04/19??and every?  ??? ??Due In Future?  ??? ? ? ?Alcohol Misuse Screening not due until??01/01/20??and every 1??year(s)  ??? ? ? ?Obesity Screening not due until??01/01/20??and every 1??year(s)  ??? ? ? ?Smoking Cessation not due until??01/01/20??and every 1??year(s)  ??? ? ? ?Colorectal Screening not due until??04/03/20??and every 1??year(s)  ??? ? ? ?Diabetes Maintenance-Fasting Lipid Profile not due until??04/03/20??and every 1??year(s)  ??? ? ? ?Diabetes Maintenance-Foot Exam not due until??04/03/20??and every 1??year(s)  ??? ? ? ?Diabetes Maintenance-HgbA1c not due until??04/03/20??and every 1??year(s)  ??? ? ? ?Hypertension Management-BMP not due until??04/03/20??and every 1??year(s)  ??? ? ? ?Diabetes Maintenance-Microalbumin not due until??04/04/20??and every 1??year(s)  ??? ? ? ?Diabetes Maintenance-Serum Creatinine not due until??04/04/20??and every 1??year(s)  ??? ? ? ?Diabetes Maintenance-Urine Dipstick not due until??04/04/20??and every  1??year(s)  ??? ? ? ?Smoking Cessation (Diabetes) not due until??08/15/20??and every 2??year(s)  ??? ? ? ?Blood Pressure Screening not due until??09/03/20??and every 1??year(s)  ??? ? ? ?Body Mass Index Check not due until??09/03/20??and every 1??year(s)  ??? ? ? ?Depression Screening not due until??09/03/20??and every 1??year(s)  ??? ? ? ?Hypertension Management-Blood Pressure not due until??09/03/20??and every 1??year(s)  ???Satisfied?(in the past 1 year)  ??? ??Satisfied?  ??? ? ? ?ADL Screening on??09/04/19.??Satisfied by Chelly Oconnor LPN  ??? ? ? ?Alcohol Misuse Screening on??09/04/19.??Satisfied by Delia Burnett  ??? ? ? ?Blood Pressure Screening on??09/04/19.??Satisfied by Chelly Oconnor LPN  ??? ? ? ?Body Mass Index Check on??09/04/19.??Satisfied by Chelly Oconnor LPN  ??? ? ? ?Breast Cancer Screening on??09/18/18.??Satisfied by Nancy Osborne  ??? ? ? ?Colorectal Screening on??04/04/19.??Satisfied by Salima Ness  ??? ? ? ?Depression Screening on??09/04/19.??Satisfied by Chelly Oconnor LPN  ??? ? ? ?Diabetes Maintenance-Medication Prescribed on??09/04/19.??Satisfied by Delia Burnett  ??? ? ? ?Diabetes Screening on??04/04/19.??Satisfied by Mike Hudson Jr.  ??? ? ? ?Hypertension Management-Blood Pressure on??09/04/19.??Satisfied by Chelly Oconnor LPN  ??? ? ? ?Influenza Vaccine on??12/06/18.??Satisfied by Clare Phelps LPN  ??? ? ? ?Lipid Screening on??04/04/19.??Satisfied by Mike Hudson Jr.  ??? ? ? ?Obesity Screening on??09/04/19.??Satisfied by Chelly Oconnor LPN  ??? ? ? ?Smoking Cessation on??09/04/19.??Satisfied by Delia Burnett??Reason: Expectation Satisfied Elsewhere  ??? ? ? ?Tetanus Vaccine on??04/04/19.??Satisfied by Yvette Mccormick LPN  ?

## 2022-05-02 NOTE — HISTORICAL [facility] CERNER
This is a historical note converted from Perfecto. Formatting and pictures may have been removed.  Please reference Perfecto for original formatting and attached multimedia. PROCEDURE:??Colonoscopy  ?  INDICATION:?55-year-old woman?presenting for screening colonoscopy secondary to a positive FIT  ?  CONSENT:?  The benefits, risks, and alternatives to the procedure were discussed and informed consent was obtained from the patient.?  ?  PREPARATION:?  EKG, pulse, pulse oximetry, and blood pressure were monitored throughout the procedure.?  ?  MEDICATIONS:?  Monitored anesthesia care per anesthesiology  ?  ATTENDING SURGEON: Adrian Jorge MD  RESIDENT: Edgardo Gonzalez MD  ?   RECTAL EXAM:?  Normal rectal exam.? External hemorrhoids present,?normal tone, no masses palpated.  ?  PROCEDURE:?The colonoscope was passed through the anus under direct visualization and was?advanced with ease to the cecum with visualization of the cecal folds, appendiceal orifice, and IC valve. ?The scope was withdrawn and the mucosa was carefully examined. ?A cluster of hyperplastic polyps?were found at the rectosigmoid junction?and biopsies were taken with the forceps. ?Retroflexion was performed in the rectum. ?The patient tolerated the procedure well. ?The preparation was good.?  ?  FINDINGS:?  Cluster of hyperplastic polyps at the rectosigmoid junction  No obstructing masses  See media section for intraoperative images  ?  UNPLANNED EVENTS:?  There were no unplanned events.?  ?  RECOMMENDATIONS:?  Discharge home  Follow-up pathology  Repeat colonoscopy in?3 years  ?   Edgardo Gonzalez MD  Surgery, PGY-2

## 2022-05-02 NOTE — HISTORICAL [facility] C[facility]ER
This is a historical note converted from Perfecto. Formatting and pictures may have been removed.  Please reference Perfecto for original formatting and attached multimedia. Chief Complaint  Follow up on left ankle/foot  History of Present Illness  55-year-old female smoker?who sustained?a twisting injury?when her left leg was in a hole on 9/6/2020.??She sustained a?nondisplaced left lateral malleolus and?zone 1?base of the fifth metatarsal fracture. ?She was last seen in clinic on 9/21/2020.  ?  ?  Review of Systems  Shortness of breath or chest pain  Physical Exam  Vitals & Measurements  HR:?92(Peripheral)? BP:?135/85?  HT:?170.00?cm? WT:?133.800?kg? BMI:?46.3?  General NAD  Cardiology regular rate and rhythm per radial pulse  Left lower extremity  No wounds or abrasions  She has good ankle dorsiflexion and plantarflexion  Gastrocsoleus EHL FHL and tib ant 5/5 motor  Sensation intact throughout  Minimal tenderness to palpation at the distal anterior aspect of the fibula and nontender at the metatarsal  Cap refill less than 2 secs  ?   X-ray left ankle/foot: Improvement/more callus in the lateral malleolus oblique fracture, still fracture noted the distal tip?but appears to be stable from previous x-rays, the base of the fifth metatarsal?does have some callus formation noted and no changes from previous as far as alignment  Assessment/Plan  Ankle fracture, left?S82.892A  Ordered:  XR Ankle Left Minimum 3 Views, Routine, 10/05/20 11:48:00 CDT, None, Ambulatory, Rad Type, Ankle fracture, left, Not Scheduled, 10/05/20 11:48:00 CDT  ?  Fracture of metatarsal of left foot, open?S92.302B  Ordered:  XR Foot Left Minimum 3 Views, Routine, 10/05/20 11:48:00 CDT, None, Ambulatory, Rad Type, Fracture of metatarsal of left foot, open, Not Scheduled, 10/05/20 11:48:00 CDT  ?  55-year-old female who on 9/6/2020 sustained a nondisplaced left lateral malleolus fracture?and?zone 1 base of the fifth metatarsal fracture.  ?  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  ?  ?  Zoe Kraft?was evaluated at the time of the encounter with?Dr Abarca.? HPI, PE and treatment plan was reviewed.? Treatment plan was reasonable and necessary.??Imaging was reviewed at the time of visit.??   Medications  acetaminophen-hydrocodone 325 mg-5 mg oral tablet, 1 tab(s), Oral, TID,? ?Not Taking, Completed Rx  Astelin 137 mcg/inh nasal spray, 1 spray(s), Nasal, BID, 11 refills  Calcium 600+D, Oral, Daily  Cranberry oral tablet  cyclobenzaprine 10 mg oral tablet, See Instructions,? ?Not Taking, Completed Rx  fluticasone 50 mcg/inh nasal spray, 2 spray(s), Nasal, At Bedtime, 11 refills  furosemide 20 mg oral tablet, See Instructions, 2 refills  furosemide 20 mg oral tablet, See Instructions,? ?Not Taking, Completed Rx  gabapentin 300 mg oral capsule, 300 mg= 1 cap(s), Oral, TID, 5 refills  lidocaine 0.5% topical gel, 1 abram, TOP, TID, 1 refills,? ?Not taking  lisinopril 40 mg oral tablet, 40 mg= 1 tab(s), Oral, Daily, 5 refills  loratadine 10 mg oral tablet, 10 mg= 1 tab(s), Oral, Daily, 3 refills  lovastatin 40 mg oral tablet, 40 mg= 1 tab(s), Oral, Once a day (at bedtime), 3 refills  metFORMIN 1000 mg oral tablet, 1000 mg= 1 tab(s), Oral, BID, 5 refills,? ?Not taking  metformin 500 mg oral tablet, 500 mg= 1 tab(s), Oral, BID  Mobic 15  mg oral tablet, 15 mg= 1 tab(s), Oral, Daily, 3 refills  MoviPrep oral powder for reconstitution,? ?Not taking  Outpatient Physical Therapy, See Instructions,? ?Not taking  Vitamin B12,? ?Not taking

## 2022-06-03 DIAGNOSIS — Z01.419 ROUTINE GYNECOLOGICAL EXAMINATION: Primary | ICD-10-CM

## 2022-08-19 ENCOUNTER — TELEPHONE (OUTPATIENT)
Dept: ADMINISTRATIVE | Facility: HOSPITAL | Age: 57
End: 2022-08-19
Payer: MEDICAID

## 2022-08-19 DIAGNOSIS — O16.3 HYPERTENSION AFFECTING PREGNANCY IN THIRD TRIMESTER: Primary | ICD-10-CM

## 2022-08-19 RX ORDER — LISINOPRIL 40 MG/1
40 TABLET ORAL DAILY
Qty: 90 TABLET | Refills: 3 | Status: CANCELLED | OUTPATIENT
Start: 2022-08-19 | End: 2023-08-19

## 2022-08-19 NOTE — TELEPHONE ENCOUNTER
LOV 02/2022  NOV 08/25/2022  Cancelled x 1 lab due end date 08/15/2022  I stressed importance of appt. , patient has concerns of taking time off of work for appt., but does not want to have stroke without med. Appt. Secure and patient states she will come to lab and appt.

## 2022-08-24 ENCOUNTER — LAB VISIT (OUTPATIENT)
Dept: LAB | Facility: HOSPITAL | Age: 57
End: 2022-08-24
Attending: NURSE PRACTITIONER
Payer: MEDICAID

## 2022-08-24 DIAGNOSIS — Z00.00 WELLNESS EXAMINATION: Primary | ICD-10-CM

## 2022-08-24 LAB
ALBUMIN SERPL-MCNC: 3.9 GM/DL (ref 3.5–5)
ALBUMIN/GLOB SERPL: 1.4 RATIO (ref 1.1–2)
ALP SERPL-CCNC: 70 UNIT/L (ref 40–150)
ALT SERPL-CCNC: 11 UNIT/L (ref 0–55)
APPEARANCE UR: ABNORMAL
AST SERPL-CCNC: 11 UNIT/L (ref 5–34)
BACTERIA #/AREA URNS AUTO: ABNORMAL /HPF
BASOPHILS # BLD AUTO: 0.02 X10(3)/MCL (ref 0–0.2)
BASOPHILS NFR BLD AUTO: 0.3 %
BILIRUB UR QL STRIP.AUTO: NEGATIVE MG/DL
BILIRUBIN DIRECT+TOT PNL SERPL-MCNC: 0.4 MG/DL
BUN SERPL-MCNC: 16 MG/DL (ref 9.8–20.1)
CALCIUM SERPL-MCNC: 9.7 MG/DL (ref 8.4–10.2)
CHLORIDE SERPL-SCNC: 106 MMOL/L (ref 98–107)
CHOLEST SERPL-MCNC: 180 MG/DL
CHOLEST/HDLC SERPL: 4 {RATIO} (ref 0–5)
CO2 SERPL-SCNC: 28 MMOL/L (ref 22–29)
COLOR UR AUTO: YELLOW
CREAT SERPL-MCNC: 1.09 MG/DL (ref 0.55–1.02)
DEPRECATED CALCIDIOL+CALCIFEROL SERPL-MC: 47.4 NG/ML (ref 30–80)
EOSINOPHIL # BLD AUTO: 0.13 X10(3)/MCL (ref 0–0.9)
EOSINOPHIL NFR BLD AUTO: 1.8 %
ERYTHROCYTE [DISTWIDTH] IN BLOOD BY AUTOMATED COUNT: 12.5 % (ref 11.5–17)
EST. AVERAGE GLUCOSE BLD GHB EST-MCNC: 122.6 MG/DL
GFR SERPLBLD CREATININE-BSD FMLA CKD-EPI: 60 MLS/MIN/1.73/M2
GLOBULIN SER-MCNC: 2.8 GM/DL (ref 2.4–3.5)
GLUCOSE SERPL-MCNC: 103 MG/DL (ref 74–100)
GLUCOSE UR QL STRIP.AUTO: NORMAL MG/DL
HBA1C MFR BLD: 5.9 %
HCT VFR BLD AUTO: 38.6 % (ref 37–47)
HDLC SERPL-MCNC: 41 MG/DL (ref 35–60)
HGB BLD-MCNC: 12.4 GM/DL (ref 12–16)
HYALINE CASTS #/AREA URNS LPF: ABNORMAL /LPF
IMM GRANULOCYTES # BLD AUTO: 0.02 X10(3)/MCL (ref 0–0.04)
IMM GRANULOCYTES NFR BLD AUTO: 0.3 %
KETONES UR QL STRIP.AUTO: NEGATIVE MG/DL
LDLC SERPL CALC-MCNC: 121 MG/DL (ref 50–140)
LEUKOCYTE ESTERASE UR QL STRIP.AUTO: 250 UNIT/L
LYMPHOCYTES # BLD AUTO: 2.49 X10(3)/MCL (ref 0.6–4.6)
LYMPHOCYTES NFR BLD AUTO: 34.7 %
MCH RBC QN AUTO: 31.1 PG (ref 27–31)
MCHC RBC AUTO-ENTMCNC: 32.1 MG/DL (ref 33–36)
MCV RBC AUTO: 96.7 FL (ref 80–94)
MONOCYTES # BLD AUTO: 0.7 X10(3)/MCL (ref 0.1–1.3)
MONOCYTES NFR BLD AUTO: 9.7 %
MUCOUS THREADS URNS QL MICRO: ABNORMAL /LPF
NEUTROPHILS # BLD AUTO: 3.8 X10(3)/MCL (ref 2.1–9.2)
NEUTROPHILS NFR BLD AUTO: 53.2 %
NITRITE UR QL STRIP.AUTO: ABNORMAL
NRBC BLD AUTO-RTO: 0 %
PH UR STRIP.AUTO: 7 [PH]
PLATELET # BLD AUTO: 251 X10(3)/MCL (ref 130–400)
PMV BLD AUTO: 10.2 FL (ref 7.4–10.4)
POTASSIUM SERPL-SCNC: 4.5 MMOL/L (ref 3.5–5.1)
PROT SERPL-MCNC: 6.7 GM/DL (ref 6.4–8.3)
PROT UR QL STRIP.AUTO: ABNORMAL MG/DL
RBC # BLD AUTO: 3.99 X10(6)/MCL (ref 4.2–5.4)
RBC #/AREA URNS AUTO: ABNORMAL /HPF
RBC UR QL AUTO: ABNORMAL UNIT/L
SODIUM SERPL-SCNC: 143 MMOL/L (ref 136–145)
SP GR UR STRIP.AUTO: 1.02
SQUAMOUS #/AREA URNS LPF: ABNORMAL /HPF
T4 FREE SERPL-MCNC: 1.06 NG/DL (ref 0.7–1.48)
TRIGL SERPL-MCNC: 91 MG/DL (ref 37–140)
TSH SERPL-ACNC: 0.4 UIU/ML (ref 0.35–4.94)
UROBILINOGEN UR STRIP-ACNC: NORMAL MG/DL
VLDLC SERPL CALC-MCNC: 18 MG/DL
WBC # SPEC AUTO: 7.2 X10(3)/MCL (ref 4.5–11.5)
WBC #/AREA URNS AUTO: ABNORMAL /HPF

## 2022-08-24 PROCEDURE — 80061 LIPID PANEL: CPT

## 2022-08-24 PROCEDURE — 83036 HEMOGLOBIN GLYCOSYLATED A1C: CPT

## 2022-08-24 PROCEDURE — 84443 ASSAY THYROID STIM HORMONE: CPT

## 2022-08-24 PROCEDURE — 82306 VITAMIN D 25 HYDROXY: CPT

## 2022-08-24 PROCEDURE — 80053 COMPREHEN METABOLIC PANEL: CPT

## 2022-08-24 PROCEDURE — 81001 URINALYSIS AUTO W/SCOPE: CPT

## 2022-08-24 PROCEDURE — 84439 ASSAY OF FREE THYROXINE: CPT

## 2022-08-24 PROCEDURE — 87077 CULTURE AEROBIC IDENTIFY: CPT

## 2022-08-24 PROCEDURE — 36415 COLL VENOUS BLD VENIPUNCTURE: CPT

## 2022-08-24 PROCEDURE — 85025 COMPLETE CBC W/AUTO DIFF WBC: CPT

## 2022-08-24 RX ORDER — LISINOPRIL 40 MG/1
TABLET ORAL
OUTPATIENT
Start: 2022-08-24

## 2022-08-24 RX ORDER — LISINOPRIL 40 MG/1
TABLET ORAL
COMMUNITY
Start: 2022-04-27 | End: 2022-08-25 | Stop reason: SDUPTHER

## 2022-08-25 ENCOUNTER — OFFICE VISIT (OUTPATIENT)
Dept: INTERNAL MEDICINE | Facility: CLINIC | Age: 57
End: 2022-08-25
Payer: MEDICAID

## 2022-08-25 VITALS
BODY MASS INDEX: 42.11 KG/M2 | SYSTOLIC BLOOD PRESSURE: 110 MMHG | HEART RATE: 103 BPM | WEIGHT: 262 LBS | TEMPERATURE: 98 F | HEIGHT: 66 IN | RESPIRATION RATE: 16 BRPM | DIASTOLIC BLOOD PRESSURE: 61 MMHG

## 2022-08-25 DIAGNOSIS — F41.8 MIXED ANXIETY DEPRESSIVE DISORDER: ICD-10-CM

## 2022-08-25 DIAGNOSIS — I10 PRIMARY HYPERTENSION: ICD-10-CM

## 2022-08-25 DIAGNOSIS — F17.210 CIGARETTE NICOTINE DEPENDENCE WITHOUT COMPLICATION: ICD-10-CM

## 2022-08-25 DIAGNOSIS — E78.2 MIXED HYPERLIPIDEMIA: ICD-10-CM

## 2022-08-25 DIAGNOSIS — J31.0 CHRONIC RHINITIS: ICD-10-CM

## 2022-08-25 DIAGNOSIS — E11.9 TYPE 2 DIABETES MELLITUS WITHOUT COMPLICATION, WITHOUT LONG-TERM CURRENT USE OF INSULIN: Primary | ICD-10-CM

## 2022-08-25 PROBLEM — M47.816 SPONDYLOSIS OF LUMBAR SPINE: Status: ACTIVE | Noted: 2022-08-25

## 2022-08-25 PROBLEM — N18.2 STAGE 2 CHRONIC KIDNEY DISEASE DUE TO TYPE 2 DIABETES MELLITUS: Status: ACTIVE | Noted: 2022-08-25

## 2022-08-25 PROBLEM — G89.29 CHRONIC BACK PAIN: Status: ACTIVE | Noted: 2022-08-25

## 2022-08-25 PROBLEM — M54.9 CHRONIC BACK PAIN: Status: ACTIVE | Noted: 2022-08-25

## 2022-08-25 PROBLEM — M47.812 SPONDYLOSIS OF CERVICAL SPINE: Status: ACTIVE | Noted: 2022-08-25

## 2022-08-25 PROBLEM — E11.22 STAGE 2 CHRONIC KIDNEY DISEASE DUE TO TYPE 2 DIABETES MELLITUS: Status: ACTIVE | Noted: 2022-08-25

## 2022-08-25 PROCEDURE — 3078F PR MOST RECENT DIASTOLIC BLOOD PRESSURE < 80 MM HG: ICD-10-PCS | Mod: CPTII,,, | Performed by: NURSE PRACTITIONER

## 2022-08-25 PROCEDURE — 3008F PR BODY MASS INDEX (BMI) DOCUMENTED: ICD-10-PCS | Mod: CPTII,,, | Performed by: NURSE PRACTITIONER

## 2022-08-25 PROCEDURE — 3008F BODY MASS INDEX DOCD: CPT | Mod: CPTII,,, | Performed by: NURSE PRACTITIONER

## 2022-08-25 PROCEDURE — 99406 BEHAV CHNG SMOKING 3-10 MIN: CPT | Mod: S$PBB,,, | Performed by: NURSE PRACTITIONER

## 2022-08-25 PROCEDURE — 4010F ACE/ARB THERAPY RXD/TAKEN: CPT | Mod: CPTII,,, | Performed by: NURSE PRACTITIONER

## 2022-08-25 PROCEDURE — 99214 OFFICE O/P EST MOD 30 MIN: CPT | Mod: S$PBB,25,, | Performed by: NURSE PRACTITIONER

## 2022-08-25 PROCEDURE — 99213 OFFICE O/P EST LOW 20 MIN: CPT | Mod: PBBFAC | Performed by: NURSE PRACTITIONER

## 2022-08-25 PROCEDURE — 99406 PR TOBACCO USE CESSATION INTERMEDIATE 3-10 MINUTES: ICD-10-PCS | Mod: S$PBB,,, | Performed by: NURSE PRACTITIONER

## 2022-08-25 PROCEDURE — 3074F PR MOST RECENT SYSTOLIC BLOOD PRESSURE < 130 MM HG: ICD-10-PCS | Mod: CPTII,,, | Performed by: NURSE PRACTITIONER

## 2022-08-25 PROCEDURE — 1160F PR REVIEW ALL MEDS BY PRESCRIBER/CLIN PHARMACIST DOCUMENTED: ICD-10-PCS | Mod: CPTII,,, | Performed by: NURSE PRACTITIONER

## 2022-08-25 PROCEDURE — 3060F PR POS MICROALBUMINURIA RESULT DOCUMENTED/REVIEW: ICD-10-PCS | Mod: CPTII,,, | Performed by: NURSE PRACTITIONER

## 2022-08-25 PROCEDURE — 3066F NEPHROPATHY DOC TX: CPT | Mod: CPTII,,, | Performed by: NURSE PRACTITIONER

## 2022-08-25 PROCEDURE — 1159F PR MEDICATION LIST DOCUMENTED IN MEDICAL RECORD: ICD-10-PCS | Mod: CPTII,,, | Performed by: NURSE PRACTITIONER

## 2022-08-25 PROCEDURE — 3074F SYST BP LT 130 MM HG: CPT | Mod: CPTII,,, | Performed by: NURSE PRACTITIONER

## 2022-08-25 PROCEDURE — 99214 PR OFFICE/OUTPT VISIT, EST, LEVL IV, 30-39 MIN: ICD-10-PCS | Mod: S$PBB,25,, | Performed by: NURSE PRACTITIONER

## 2022-08-25 PROCEDURE — 3066F PR DOCUMENTATION OF TREATMENT FOR NEPHROPATHY: ICD-10-PCS | Mod: CPTII,,, | Performed by: NURSE PRACTITIONER

## 2022-08-25 PROCEDURE — 3078F DIAST BP <80 MM HG: CPT | Mod: CPTII,,, | Performed by: NURSE PRACTITIONER

## 2022-08-25 PROCEDURE — 1160F RVW MEDS BY RX/DR IN RCRD: CPT | Mod: CPTII,,, | Performed by: NURSE PRACTITIONER

## 2022-08-25 PROCEDURE — 3060F POS MICROALBUMINURIA REV: CPT | Mod: CPTII,,, | Performed by: NURSE PRACTITIONER

## 2022-08-25 PROCEDURE — 1159F MED LIST DOCD IN RCRD: CPT | Mod: CPTII,,, | Performed by: NURSE PRACTITIONER

## 2022-08-25 PROCEDURE — 4010F PR ACE/ARB THEARPY RXD/TAKEN: ICD-10-PCS | Mod: CPTII,,, | Performed by: NURSE PRACTITIONER

## 2022-08-25 RX ORDER — BUSPIRONE HYDROCHLORIDE 5 MG/1
5 TABLET ORAL 3 TIMES DAILY
Qty: 270 TABLET | Refills: 1 | Status: SHIPPED | OUTPATIENT
Start: 2022-08-25 | End: 2023-02-23 | Stop reason: SDUPTHER

## 2022-08-25 RX ORDER — LORATADINE 10 MG/1
TABLET ORAL
COMMUNITY
Start: 2022-06-30 | End: 2022-08-25

## 2022-08-25 RX ORDER — HYDROCHLOROTHIAZIDE 12.5 MG/1
12.5 TABLET ORAL DAILY
Qty: 90 TABLET | Refills: 1 | Status: SHIPPED | OUTPATIENT
Start: 2022-08-25 | End: 2023-02-23 | Stop reason: SDUPTHER

## 2022-08-25 RX ORDER — BUSPIRONE HYDROCHLORIDE 5 MG/1
5 TABLET ORAL 3 TIMES DAILY
COMMUNITY
Start: 2022-07-27 | End: 2022-08-25 | Stop reason: SDUPTHER

## 2022-08-25 RX ORDER — FLUTICASONE PROPIONATE 50 MCG
SPRAY, SUSPENSION (ML) NASAL
COMMUNITY
Start: 2022-02-01 | End: 2022-08-25 | Stop reason: SDUPTHER

## 2022-08-25 RX ORDER — FUROSEMIDE 20 MG/1
TABLET ORAL
COMMUNITY
Start: 2022-02-01 | End: 2022-08-25 | Stop reason: SDUPTHER

## 2022-08-25 RX ORDER — CETIRIZINE HYDROCHLORIDE 10 MG/1
10 TABLET ORAL DAILY
Qty: 90 TABLET | Refills: 1 | Status: SHIPPED | OUTPATIENT
Start: 2022-08-25 | End: 2023-02-23 | Stop reason: SDUPTHER

## 2022-08-25 RX ORDER — FLUTICASONE PROPIONATE 50 MCG
1 SPRAY, SUSPENSION (ML) NASAL DAILY
Qty: 16 G | Refills: 6 | Status: SHIPPED | OUTPATIENT
Start: 2022-08-25 | End: 2023-02-23 | Stop reason: SDUPTHER

## 2022-08-25 RX ORDER — LOVASTATIN 40 MG/1
TABLET ORAL
COMMUNITY
Start: 2022-04-27 | End: 2022-08-25 | Stop reason: SDUPTHER

## 2022-08-25 RX ORDER — LISINOPRIL 40 MG/1
40 TABLET ORAL DAILY
Qty: 90 TABLET | Refills: 1 | Status: SHIPPED | OUTPATIENT
Start: 2022-08-25 | End: 2023-02-23 | Stop reason: SDUPTHER

## 2022-08-25 RX ORDER — FUROSEMIDE 20 MG/1
20 TABLET ORAL DAILY
Qty: 90 TABLET | Refills: 1 | Status: SHIPPED | OUTPATIENT
Start: 2022-08-25 | End: 2023-02-23 | Stop reason: SDUPTHER

## 2022-08-25 RX ORDER — LOVASTATIN 40 MG/1
40 TABLET ORAL NIGHTLY
Qty: 90 TABLET | Refills: 1 | Status: SHIPPED | OUTPATIENT
Start: 2022-08-25 | End: 2023-02-23 | Stop reason: SDUPTHER

## 2022-08-25 RX ORDER — METFORMIN HYDROCHLORIDE 500 MG/1
500 TABLET ORAL 2 TIMES DAILY
COMMUNITY
Start: 2022-04-27 | End: 2022-08-25 | Stop reason: SDUPTHER

## 2022-08-25 RX ORDER — HYDROCHLOROTHIAZIDE 12.5 MG/1
TABLET ORAL
COMMUNITY
Start: 2022-06-10 | End: 2022-08-25 | Stop reason: SDUPTHER

## 2022-08-25 RX ORDER — METFORMIN HYDROCHLORIDE 500 MG/1
500 TABLET ORAL 2 TIMES DAILY
Qty: 180 TABLET | Refills: 1 | Status: SHIPPED | OUTPATIENT
Start: 2022-08-25 | End: 2023-02-23 | Stop reason: SDUPTHER

## 2022-08-25 NOTE — ASSESSMENT & PLAN NOTE
RX lovastatin 40 mg q hs  Follow a low cholesterol, low saturated fat diet with less than 200 mg of cholesterol a day.   Avoid fried foods and high saturated fats (villanueva, sausage, cookies, cakes, chips, cheese, whole milk, butter, mayonnaise, creamy dressings, gravy, stew, gumbo, boudin, cracklins and cream sauces).  Add flax seed or fish oil supplements to diet.   Increase dietary fiber.   Regular exercise improves cholesterol levels.  Physical activity 5 times a week for 30 minutes per day (or 150 minutes per week).   Stressed importance of dietary modifications.'

## 2022-08-25 NOTE — PROGRESS NOTES
CORI Kemp   [facility]  [address] [facility] [address]      PATIENT NAME: Zoe Kraft  : 1965  DATE: 22  MRN: [id number]      Billing Provider: CORI Kemp  Level of Service:   Patient PCP Information     Provider PCP Type    CORI Kemp General          Reason for Visit / Chief Complaint: Follow-up (Lab review )       History of Present Illness / Problem Focused Workflow     Zoe Kraft presents to the clinic with Follow-up (Lab review )     58 yo WF here today for f/u. PMHx includes HTN, T2DM, CKD2, HLD, frequent UTIs, depression with anxiety, cholelithiasis, self-reported kidney stones, obesity, chronic rhinitis, and tobacco use.     Cervical Cancer Screening - 22 [facility] GYN  Breast Cancer Screening - 21 [facility]  Colon Cancer Screening - 20 Colonoscopy - 10 yr f/u  Osteoporosis Screening - 22 Vitamin D Level 47.4  Diabetic Screening -22 Fundal Exam . 22 Foot Exam. JAK  Lung Cancer Screening - 22 Referral     22  Pt here today for lab review and med refills. Labs reviewed and discussed with no questions or concerns at this time. Pt's meds reviewed and refilled appropriately. Pt with recent loss of her son, she reports she is taking the buspirone as needed, does not want any referral to any grief counseling at this time. Agreeable to 6 month f/u via audio with labs completed in [place].   Denies chest pain, shortness of breath, cough, fever, headache, dizziness, weakness, abdominal pain, nausea, vomiting, diarrhea, constipation, black/bloody stools, unplanned weight loss, night sweats, changes in urinary patterns, burning/odor with urination, depression, anxiety, and SI/HI.       Follow-up        Review of Systems     Review of Systems   Constitutional: Negative.    HENT: Negative.    Eyes: Negative.    Respiratory: Negative.    Cardiovascular: Negative.     Gastrointestinal: Negative.    Endocrine: Negative.    Genitourinary: Negative.    Neurological: Negative.    Psychiatric/Behavioral: Negative.        Medical / Social / Family History     Past Medical History:   Diagnosis Date    Anxiety disorder, unspecified     Chronic back pain     Chronic rhinitis     Chronic sinusitis, unspecified     CS (cervical spondylosis)     Depression     DM (diabetes mellitus), type 2     Fractured lateral malleolus     HTN (hypertension)     Knee pain     Lumbar spondylosis     Mixed hyperlipidemia     Numbness of finger     Recurrent UTI (urinary tract infection)     Stress fracture of metatarsal bone     Swelling     Tobacco abuse        Past Surgical History:   Procedure Laterality Date    biopsy gastrointestinal  2020     SECTION  1985     SECTION  1986     SECTION  1993    COLONOSCOPY  2020    KIDNEY STONE SURGERY Left     LITHOTRIPSY      x 3    TONSILLECTOMY AND ADENOIDECTOMY         Social History  Ms.  reports that she has been smoking. She has never used smokeless tobacco. She reports current alcohol use. She reports that she does not use drugs.    Family History  Ms.'s family history includes Cancer in her father; Heart disease in her father and mother; Hypertension in her father.    Medications and Allergies     Medications  Medication List with Changes/Refills   New Medications    CETIRIZINE (ZYRTEC) 10 MG TABLET    Take 1 tablet (10 mg total) by mouth once daily. For Allergies   Changed and/or Refilled Medications    Modified Medication Previous Medication    BUSPIRONE (BUSPAR) 5 MG TAB busPIRone (BUSPAR) 5 MG Tab       Take 1 tablet (5 mg total) by mouth 3 (three) times daily. As needed for anxiety    Take 5 mg by mouth 3 (three) times daily.    FLUTICASONE PROPIONATE (FLONASE) 50 MCG/ACTUATION NASAL SPRAY fluticasone propionate (FLONASE) 50 mcg/actuation nasal spray       1 spray (50  mcg total) by Each Nostril route once daily at 6am.    by Nasal route.    FUROSEMIDE (LASIX) 20 MG TABLET furosemide (LASIX) 20 MG tablet       Take 1 tablet (20 mg total) by mouth once daily.      See Instructions, TAKE 1 TABLET BY MOUTH ONCE DAILY AS NEEDED FOR  SWELLING  IN  FEET, # 30 tab(s), 1 Refill(s), Pharmacy: [id number], 170, cm, Height/Length Dosing, 02/01/22 8:42:00 CST, 127, kg, Weight Dosing, 02/01/22 8:42:00 CST    HYDROCHLOROTHIAZIDE (HYDRODIURIL) 12.5 MG TAB hydroCHLOROthiazide (HYDRODIURIL) 12.5 MG Tab       Take 1 tablet (12.5 mg total) by mouth once daily. For High Blood Pressure    TAKE 1 TABLET BY MOUTH ONCE DAILY IN THE MORNING FOR BLOOD PRESSURE    LISINOPRIL (PRINIVIL,ZESTRIL) 40 MG TABLET lisinopriL (PRINIVIL,ZESTRIL) 40 MG tablet       Take 1 tablet (40 mg total) by mouth once daily. For High Cholesterol    TAKE 1 TABLET BY MOUTH ONCE DAILY IN THE EVENING FOR BLOOD PRESSURE AND KIDNEY PROTECTION    LOVASTATIN (MEVACOR) 40 MG TABLET lovastatin (MEVACOR) 40 MG tablet       Take 1 tablet (40 mg total) by mouth nightly. For High Cholesterol    TAKE 1 TABLET BY MOUTH ONCE DAILY AT BEDTIME NIGHTLY FOR CHOLESTEROL FOR 90 DAYS    METFORMIN (GLUCOPHAGE) 500 MG TABLET metFORMIN (GLUCOPHAGE) 500 MG tablet       Take 1 tablet (500 mg total) by mouth 2 (two) times daily. For Diabetes    Take 500 mg by mouth 2 (two) times daily.   Discontinued Medications    LORATADINE (CLARITIN) 10 MG TABLET    TAKE 1 TABLET BY MOUTH ONCE DAILY FOR SINUS         Allergies  Review of patient's allergies indicates:   Allergen Reactions    Codeine     Naproxen     Naproxen sodium Palpitations and Rash       Physical Examination     Vitals:    08/25/22 0916   BP: 110/61   Pulse: 103   Resp: 16   Temp: 98.2 °F (36.8 °C)     Physical Exam  Vitals reviewed.   Constitutional:       Appearance: Normal appearance. She is normal weight.   HENT:      Head: Normocephalic.   Cardiovascular:      Rate and Rhythm: Normal  rate and regular rhythm.      Pulses: Normal pulses.      Heart sounds: Normal heart sounds.   Pulmonary:      Effort: Pulmonary effort is normal.      Breath sounds: Normal breath sounds.   Abdominal:      General: Abdomen is flat.      Palpations: Abdomen is soft.   Musculoskeletal:         General: Normal range of motion.      Cervical back: Normal range of motion.   Skin:     General: Skin is warm and dry.   Neurological:      Mental Status: She is alert.   Psychiatric:         Mood and Affect: Mood normal.           Results     Lab Results   Component Value Date    WBC 7.2 08/24/2022    RBC 3.99 (L) 08/24/2022    HGB 12.4 08/24/2022    HCT 38.6 08/24/2022    MCV 96.7 (H) 08/24/2022    MCH 31.1 (H) 08/24/2022    MCHC 32.1 (L) 08/24/2022    RDW 12.5 08/24/2022     08/24/2022    MPV 10.2 08/24/2022     Sodium Level   Date Value Ref Range Status   08/24/2022 143 136 - 145 mmol/L Final     Potassium Level   Date Value Ref Range Status   08/24/2022 4.5 3.5 - 5.1 mmol/L Final     Carbon Dioxide   Date Value Ref Range Status   08/24/2022 28 22 - 29 mmol/L Final     Blood Urea Nitrogen   Date Value Ref Range Status   08/24/2022 16.0 9.8 - 20.1 mg/dL Final     Creatinine   Date Value Ref Range Status   08/24/2022 1.09 (H) 0.55 - 1.02 mg/dL Final     Calcium Level Total   Date Value Ref Range Status   08/24/2022 9.7 8.4 - 10.2 mg/dL Final     Albumin Level   Date Value Ref Range Status   08/24/2022 3.9 3.5 - 5.0 gm/dL Final     Bilirubin Total   Date Value Ref Range Status   08/24/2022 0.4 <=1.5 mg/dL Final     Alkaline Phosphatase   Date Value Ref Range Status   08/24/2022 70 40 - 150 unit/L Final     Aspartate Aminotransferase   Date Value Ref Range Status   08/24/2022 11 5 - 34 unit/L Final     Alanine Aminotransferase   Date Value Ref Range Status   08/24/2022 11 0 - 55 unit/L Final     Estimated GFR-Non    Date Value Ref Range Status   02/01/2022 50 >=90      Lab Results   Component Value Date     CHOL 180 08/24/2022     Lab Results   Component Value Date    HDL 41 08/24/2022     No results found for: LDLCALC  Lab Results   Component Value Date    TRIG 91 08/24/2022     No results found for: CHOLHDL  Lab Results   Component Value Date    TSH 0.4016 08/24/2022     Lab Results   Component Value Date    PHUR 7.0 02/01/2022    PROTEINUA Trace (A) 08/24/2022    GLUCUA Normal 02/01/2022    KETONESU Negative 02/01/2022    OCCULTUA 1+ 02/01/2022    NITRITE 2+ 02/01/2022    LEUKOCYTESUR 250  (A) 08/24/2022     Lab Results   Component Value Date    HGBA1C 5.9 08/24/2022    HGBA1C 6.4 02/01/2022    HGBA1C 6.5 07/01/2021     No results found for: MICROALBUR, [id number]   No results found for this or any previous visit.         Assessment and Plan (including Health Maintenance)     Plan:         Health Maintenance Due   Topic Date Due    Hepatitis C Screening  Never done    Cervical Cancer Screening  Never done    COVID-19 Vaccine (1) Never done    Pneumococcal Vaccines (Age 0-64) (1 - PCV) Never done    HIV Screening  Never done    Colorectal Cancer Screening  Never done    Shingles Vaccine (1 of 2) Never done       Problem List Items Addressed This Visit        Psychiatric    Mixed anxiety depressive disorder    Current Assessment & Plan     RX buspirone 5 mg TID PRN  Read positive daily meditations, avoid negative media, set healthy boundaries.  Exercise daily, keep consistent sleep pattern, eat a healthy diet.  Establish good social support, make changes to reduce stress.  Do not drink alcohol or use illicit drugs.  Reports any symptoms of suicidal/homicidal ideations or self harm immediately, go to nearest emergency room.             Relevant Medications    busPIRone (BUSPAR) 5 MG Tab       ENT    Chronic rhinitis    Current Assessment & Plan     RX Zyrtec daily   Increase PO Fluids  Avoid/limit triggers such as pollen, dust, molds, and pet dander.   Avoid smoke, strong chemicals, cleaning products,  perfumes/colognes.             Relevant Medications    fluticasone propionate (FLONASE) 50 mcg/actuation nasal spray    cetirizine (ZYRTEC) 10 MG tablet       Cardiac/Vascular    Mixed hyperlipidemia    Current Assessment & Plan     RX lovastatin 40 mg q hs  Follow a low cholesterol, low saturated fat diet with less than 200 mg of cholesterol a day.   Avoid fried foods and high saturated fats (villanueva, sausage, cookies, cakes, chips, cheese, whole milk, butter, mayonnaise, creamy dressings, gravy, stew, gumbo, boudin, cracklins and cream sauces).  Add flax seed or fish oil supplements to diet.   Increase dietary fiber.   Regular exercise improves cholesterol levels.  Physical activity 5 times a week for 30 minutes per day (or 150 minutes per week).   Stressed importance of dietary modifications.'           Relevant Medications    lovastatin (MEVACOR) 40 MG tablet    Primary hypertension    Current Assessment & Plan     RX lisinopril 40 mg daily  RX hctz 12.5 mg daily  RX lasix 20 mg daily   Low Sodium Diet (Dash Diet - less than 2 grams of sodium per day).  Maintain healthy weight with goal BMI <30. Exercise 30 minutes per day 5 days per week.             Relevant Medications    furosemide (LASIX) 20 MG tablet    hydroCHLOROthiazide (HYDRODIURIL) 12.5 MG Tab    lisinopriL (PRINIVIL,ZESTRIL) 40 MG tablet       Endocrine    Type 2 diabetes mellitus without complication, without long-term current use of insulin - Primary    Current Assessment & Plan     RX Metformin 500 mg BID   Follow ADA diet.  Avoid soda, simple sweets, and limit rice/pasta/bread/starches and consume brown options when possible.   Maintain healthy weight with BMI goal <30.   Perform aerobic exercise for 150 minutes per week (or 5 days a week for 30 minutes each day).   Examine feet daily.              Relevant Medications    metFORMIN (GLUCOPHAGE) 500 MG tablet    Other Relevant Orders    CBC Auto Differential    Comprehensive Metabolic Panel     Urinalysis, Reflex to Urine Culture Urine, Clean Catch    Microalbumin/Creatinine Ratio, Urine    Hemoglobin A1C       Other    Cigarette nicotine dependence without complication    Current Assessment & Plan     Smoking cessation discussed > 3 mins  Lung Cancer Screening    Pt not ready to quit.  Discussed benefits of quitting including improved health, decreased cardiac/vascular/pulmonary/stroke risks as well as saving money.                   Health Maintenance Topics with due status: Not Due       Topic Last Completion Date    Influenza Vaccine 01/09/2012    TETANUS VACCINE 04/04/2019    Mammogram 02/11/2022    Lipid Panel 08/24/2022       Future Appointments   Date Time Provider Department Center   5/4/2023  7:50 AM CORI Peñaloza [facility] GYN [place] Un            Signature:     [facility]  [address] [facility] [address] [address]    Date of encounter: 8/25/22

## 2022-08-25 NOTE — ASSESSMENT & PLAN NOTE
RX Zyrtec daily   Increase PO Fluids  Avoid/limit triggers such as pollen, dust, molds, and pet dander.   Avoid smoke, strong chemicals, cleaning products, perfumes/colognes.

## 2022-08-25 NOTE — ASSESSMENT & PLAN NOTE
RX buspirone 5 mg TID PRN  Read positive daily meditations, avoid negative media, set healthy boundaries.  Exercise daily, keep consistent sleep pattern, eat a healthy diet.  Establish good social support, make changes to reduce stress.  Do not drink alcohol or use illicit drugs.  Reports any symptoms of suicidal/homicidal ideations or self harm immediately, go to nearest emergency room.

## 2022-08-25 NOTE — ASSESSMENT & PLAN NOTE
RX lisinopril 40 mg daily  RX hctz 12.5 mg daily  RX lasix 20 mg daily   Low Sodium Diet (Dash Diet - less than 2 grams of sodium per day).  Maintain healthy weight with goal BMI <30. Exercise 30 minutes per day 5 days per week.

## 2022-08-25 NOTE — ASSESSMENT & PLAN NOTE
Smoking cessation discussed > 3 mins  Lung Cancer Screening    Pt not ready to quit.  Discussed benefits of quitting including improved health, decreased cardiac/vascular/pulmonary/stroke risks as well as saving money.

## 2022-08-25 NOTE — ASSESSMENT & PLAN NOTE
RX Metformin 500 mg BID   Follow ADA diet.  Avoid soda, simple sweets, and limit rice/pasta/bread/starches and consume brown options when possible.   Maintain healthy weight with BMI goal <30.   Perform aerobic exercise for 150 minutes per week (or 5 days a week for 30 minutes each day).   Examine feet daily.

## 2022-08-27 LAB — BACTERIA UR CULT: ABNORMAL

## 2022-08-29 DIAGNOSIS — N39.0 URINARY TRACT INFECTION WITHOUT HEMATURIA, SITE UNSPECIFIED: Primary | ICD-10-CM

## 2022-08-29 RX ORDER — SULFAMETHOXAZOLE AND TRIMETHOPRIM 800; 160 MG/1; MG/1
1 TABLET ORAL 2 TIMES DAILY
Qty: 20 TABLET | Refills: 0 | Status: SHIPPED | OUTPATIENT
Start: 2022-08-29 | End: 2022-09-08

## 2022-09-09 ENCOUNTER — DOCUMENTATION ONLY (OUTPATIENT)
Dept: ADMINISTRATIVE | Facility: HOSPITAL | Age: 57
End: 2022-09-09
Payer: MEDICAID

## 2022-09-09 NOTE — PROGRESS NOTES
The following record(s) below  uploaded for Health Maintenance .            PAP SMEAR  HPV SCREENING     COLONOSCOPY

## 2022-12-19 ENCOUNTER — DOCUMENTATION ONLY (OUTPATIENT)
Dept: ADMINISTRATIVE | Facility: HOSPITAL | Age: 57
End: 2022-12-19
Payer: MEDICAID

## 2023-02-23 PROBLEM — N39.0 URINARY TRACT INFECTION WITHOUT HEMATURIA: Status: ACTIVE | Noted: 2023-02-23

## 2023-02-23 NOTE — ASSESSMENT & PLAN NOTE
RX macrobid 100 mg BID  Start antibiotics as prescribed.   Complete the full course of the medication.  Report any continuing signs such as nausea/vomiting,visible blood in urine, increased low back or flank pain, worsening burning upon urination after antibiotic completion or fever.  Drink plenty of water.  Avoid soda or carbonated beverages.   Urinate frequently; do not hold urine for extended periods of time.  Wear cotton underwear, avoid tight fitting pants.  Use OTC AZO or pyridium for relief of urinary spasms.  Women: wipe front to back, urinate after sexual intercourse, and avoid scented or irritating feminine products.

## 2023-02-23 NOTE — ASSESSMENT & PLAN NOTE
RX buspar 5 mg prn  Continue medications as prescribed  Read positive daily meditations, avoid negative media, set healthy boundaries.  Exercise daily, keep consistent sleep pattern, eat a healthy diet.  Establish good social support, make changes to reduce stress.  Do not drink alcohol or use illicit drugs.  Reports any symptoms of suicidal/homicidal ideations or self harm immediately, go to nearest emergency room.

## 2023-02-23 NOTE — ASSESSMENT & PLAN NOTE
RX lovastatin 40 mg q hs  Continue statin as prescribed.   Follow a low cholesterol, low saturated fat diet with less than 200 mg of cholesterol a day.   Avoid fried foods and high saturated fats (villanueva, sausage, cookies, cakes, chips, cheese, whole milk, butter, mayonnaise, creamy dressings, gravy, stew, gumbo, boudin, cracklins and cream sauces).  Add flax seed or fish oil supplements to diet.   Increase dietary fiber.   Regular exercise improves cholesterol levels.  Physical activity 5 times a week for 30 minutes per day (or 150 minutes per week).   Stressed importance of dietary modifications.

## 2023-02-23 NOTE — ASSESSMENT & PLAN NOTE
RX metformin 500 mg BID  Continue medications as prescribed.  Follow ADA diet.  Avoid soda, simple sweets, and limit rice/pasta/bread/starches and consume brown options when possible.   Maintain healthy weight with BMI goal <30.   Perform aerobic exercise for 150 minutes per week (or 5 days a week for 30 minutes each day).   Examine feet daily.

## 2023-02-23 NOTE — PROGRESS NOTES
CORI Kemp   [facility]  [address] [address]      PATIENT NAME: Zeo Kraft  : 1965  DATE: 23  MRN: [id number]      Patient PCP Information       Provider PCP Type    CORI Kemp General            Reason for Visit / Chief Complaint: Follow-up (Lab review  )       History of Present Illness / Problem Focused Workflow     Zoe Kraft presents to the clinic with Follow-up (Lab review  )     57 yo WF here today for f/u. PMHx includes HTN, T2DM, CKD2, HLD, frequent UTIs, depression with anxiety, cholelithiasis, self-reported kidney stones, obesity, chronic rhinitis, and tobacco use (1/2 - 1 ppd, > 35 ppy hx).     Cervical Cancer Screening - 22 [facility] GYN  Breast Cancer Screening - 23 [facility] Ordered  Colon Cancer Screening - 20 Colonoscopy - 10 yr f/u  Osteoporosis Screening - 22 Vitamin D Level 47.4  Diabetic Screening -22 Fundal Exam . 22 Foot Exam. JAK  Lung Cancer Screening - 23 Ordered     23  Pt here today via audio virtual for lab review, labs completed in [place] and are scanned into the chart for review, discussed with no questions or concerns at this time. A1C was 6.0  & urine sample grew out a bacteria indicating a UTI, will treat today with Macrobid. Meds reviewed and refilled approprietly. Will f/u again in 6 months for routine wellness with fasting labs to be completed in [place]. Agreeable to MMG order  and Lung Cancer Screening order today.   Denies chest pain, shortness of breath, cough, fever, headache, dizziness, weakness, abdominal pain, nausea, vomiting, diarrhea, constipation, black/bloody stools, unplanned weight loss, night sweats, changes in urinary patterns, burning/odor with urination, depression, anxiety, and SI/HI.         Review of Systems     Review of Systems   Constitutional: Negative.    HENT: Negative.      Eyes: Negative.    Respiratory: Negative.     Cardiovascular: Negative.    Gastrointestinal: Negative.    Endocrine: Negative.    Genitourinary: Negative.    Neurological: Negative.    Psychiatric/Behavioral: Negative.         Medications and Allergies     Medications  Current Outpatient Medications   Medication Instructions    busPIRone (BUSPAR) 5 mg, Oral, 3 times daily, As needed for anxiety    calcium carbonate (CALCIUM 500 ORAL) No dose, route, or frequency recorded.    cetirizine (ZYRTEC) 10 MG tablet No dose, route, or frequency recorded.    cetirizine (ZYRTEC) 10 mg, Oral, Daily, For Allergies    cranberry fruit concentrate (AZO CRANBERRY ORAL) No dose, route, or frequency recorded.    fluticasone propionate (FLONASE) 50 mcg, Each Nostril, Daily    furosemide (LASIX) 20 mg, Oral, Daily    hydroCHLOROthiazide (HYDRODIURIL) 12.5 mg, Oral, Daily, For High Blood Pressure    lisinopriL (PRINIVIL,ZESTRIL) 40 mg, Oral, Daily, For High Cholesterol    lovastatin (MEVACOR) 40 mg, Oral, Nightly, For High Cholesterol    metFORMIN (GLUCOPHAGE) 500 mg, Oral, 2 times daily, For Diabetes    nitrofurantoin, macrocrystal-monohydrate, (MACROBID) 100 MG capsule 100 mg, Oral, 2 times daily, For UTI         Allergies  Review of patient's allergies indicates:   Allergen Reactions    Codeine     Iodine Other (See Comments)    Naproxen     Naproxen sodium Palpitations and Rash       Physical Examination     There were no vitals taken for this visit.    Physical Exam  HENT:      Right Ear: Hearing normal.      Left Ear: Hearing normal.   Neurological:      Mental Status: She is alert and oriented to person, place, and time.   Psychiatric:         Mood and Affect: Mood normal.         Results     Lab Results   Component Value Date    WBC 7.2 08/24/2022    RBC 3.99 (L) 08/24/2022    HGB 12.4 08/24/2022    HCT 38.6 08/24/2022    MCV 96.7 (H) 08/24/2022    MCH 31.1 (H) 08/24/2022    MCHC 32.1 (L) 08/24/2022    RDW 12.5 08/24/2022      08/24/2022    MPV 10.2 08/24/2022     CMP  Sodium Level   Date Value Ref Range Status   08/24/2022 143 136 - 145 mmol/L Final     Potassium Level   Date Value Ref Range Status   08/24/2022 4.5 3.5 - 5.1 mmol/L Final     Carbon Dioxide   Date Value Ref Range Status   08/24/2022 28 22 - 29 mmol/L Final     Blood Urea Nitrogen   Date Value Ref Range Status   08/24/2022 16.0 9.8 - 20.1 mg/dL Final     Creatinine   Date Value Ref Range Status   08/24/2022 1.09 (H) 0.55 - 1.02 mg/dL Final     Calcium Level Total   Date Value Ref Range Status   08/24/2022 9.7 8.4 - 10.2 mg/dL Final     Albumin Level   Date Value Ref Range Status   08/24/2022 3.9 3.5 - 5.0 gm/dL Final     Bilirubin Total   Date Value Ref Range Status   08/24/2022 0.4 <=1.5 mg/dL Final     Alkaline Phosphatase   Date Value Ref Range Status   08/24/2022 70 40 - 150 unit/L Final     Aspartate Aminotransferase   Date Value Ref Range Status   08/24/2022 11 5 - 34 unit/L Final     Alanine Aminotransferase   Date Value Ref Range Status   08/24/2022 11 0 - 55 unit/L Final     Estimated GFR-Non    Date Value Ref Range Status   02/01/2022 50 >=90      Lab Results   Component Value Date    CHOL 180 08/24/2022     Lab Results   Component Value Date    HDL 41 08/24/2022     No results found for: LDLCALC  Lab Results   Component Value Date    TRIG 91 08/24/2022     No results found for: CHOLHDL  Lab Results   Component Value Date    TSH 0.4016 08/24/2022         Assessment        ICD-10-CM ICD-9-CM   1. Urinary tract infection without hematuria, site unspecified  N39.0 599.0   2. Mixed anxiety depressive disorder  F41.8 300.4   3. Chronic rhinitis  J31.0 472.0   4. Primary hypertension  I10 401.9   5. Mixed hyperlipidemia  E78.2 272.2   6. Type 2 diabetes mellitus without complication, without long-term current use of insulin  E11.9 250.00   7. Cigarette nicotine dependence without complication  F17.210 305.1   8. Wellness examination  Z00.00  V70.0   9. Screening examination for STD (sexually transmitted disease)  Z11.3 V74.5   10. Screening for HIV (human immunodeficiency virus)  Z11.4 V73.89   11. Need for hepatitis C screening test  Z11.59 V73.89   12. Encounter for screening mammogram for malignant neoplasm of breast  Z12.31 V76.12        Plan      Problem List Items Addressed This Visit          Psychiatric    Mixed anxiety depressive disorder    Current Assessment & Plan     RX buspar 5 mg prn  Continue medications as prescribed  Read positive daily meditations, avoid negative media, set healthy boundaries.  Exercise daily, keep consistent sleep pattern, eat a healthy diet.  Establish good social support, make changes to reduce stress.  Do not drink alcohol or use illicit drugs.  Reports any symptoms of suicidal/homicidal ideations or self harm immediately, go to nearest emergency room.           Relevant Medications    busPIRone (BUSPAR) 5 MG Tab       ENT    Chronic rhinitis    Current Assessment & Plan     RX zyrtec daily  OTC antihistamines as needed (i.e. Zyrtec, Claritin, Allergra, Benadryl)  Increase PO Fluids  Avoid/limit triggers such as pollen, dust, molds, and pet dander.   Avoid smoke, strong chemicals, cleaning products, perfumes/colognes.           Relevant Medications    cetirizine (ZYRTEC) 10 MG tablet    fluticasone propionate (FLONASE) 50 mcg/actuation nasal spray       Cardiac/Vascular    Mixed hyperlipidemia    Current Assessment & Plan     RX lovastatin 40 mg q hs  Continue statin as prescribed.   Follow a low cholesterol, low saturated fat diet with less than 200 mg of cholesterol a day.   Avoid fried foods and high saturated fats ([place], sausage, cookies, cakes, chips, cheese, whole milk, butter, mayonnaise, creamy dressings, gravy, stew, gumbo, boudin, cracklins and cream sauces).  Add flax seed or fish oil supplements to diet.   Increase dietary fiber.   Regular exercise improves cholesterol levels.  Physical activity 5  times a week for 30 minutes per day (or 150 minutes per week).   Stressed importance of dietary modifications.           Relevant Medications    lovastatin (MEVACOR) 40 MG tablet    Primary hypertension    Current Assessment & Plan     RX lisinopril 40 mg daily  RX hctz 12.5 mg daily  RX lasix 20 mg daily  Low Sodium Diet (Dash Diet - less than 2 grams of sodium per day).  Maintain healthy weight with goal BMI <30. Exercise 30 minutes per day 5 days per week.           Relevant Medications    furosemide (LASIX) 20 MG tablet    hydroCHLOROthiazide (HYDRODIURIL) 12.5 MG Tab    lisinopriL (PRINIVIL,ZESTRIL) 40 MG tablet       Renal/    Urinary tract infection without hematuria - Primary    Current Assessment & Plan     RX macrobid 100 mg BID  Start antibiotics as prescribed.   Complete the full course of the medication.  Report any continuing signs such as nausea/vomiting,visible blood in urine, increased low back or flank pain, worsening burning upon urination after antibiotic completion or fever.  Drink plenty of water.  Avoid soda or carbonated beverages.   Urinate frequently; do not hold urine for extended periods of time.  Wear cotton underwear, avoid tight fitting pants.  Use OTC AZO or pyridium for relief of urinary spasms.  Women: wipe front to back, urinate after sexual intercourse, and avoid scented or irritating feminine products.           Relevant Medications    nitrofurantoin, macrocrystal-monohydrate, (MACROBID) 100 MG capsule       Endocrine    Type 2 diabetes mellitus without complication, without long-term current use of insulin    Current Assessment & Plan     RX metformin 500 mg BID  Continue medications as prescribed.  Follow ADA diet.  Avoid soda, simple sweets, and limit rice/pasta/bread/starches and consume brown options when possible.   Maintain healthy weight with BMI goal <30.   Perform aerobic exercise for 150 minutes per week (or 5 days a week for 30 minutes each day).   Examine feet  daily.            Relevant Medications    metFORMIN (GLUCOPHAGE) 500 MG tablet    Other Relevant Orders    Hemoglobin A1C    Microalbumin/Creatinine Ratio, Urine       Other    Cigarette nicotine dependence without complication    Current Assessment & Plan     Smoking cessation discussed > 3 mins.  Pt not ready to quit.  Discussed benefits of quitting including improved health, decreased cardiac/vascular/pulmonary/stroke risks as well as saving money.           Relevant Orders    CT Chest Lung Screening Low Dose     Other Visit Diagnoses       Wellness examination        Relevant Orders    CBC Auto Differential    Comprehensive Metabolic Panel    Lipid Panel    TSH    T4, Free    Urinalysis, Reflex to Urine Culture    Vitamin D    Screening examination for STD (sexually transmitted disease)        Relevant Orders    Chlamydia/GC, PCR    SYPHILIS ANTIBODY (WITH REFLEX RPR)    Screening for HIV (human immunodeficiency virus)        Relevant Orders    HIV 1/2 Ag/Ab (4th Gen)    Need for hepatitis C screening test        Relevant Orders    Hepatitis C Antibody    Encounter for screening mammogram for malignant neoplasm of breast        Relevant Orders    Mammo Digital Screening Bilat w/ Yosef            Future Appointments   Date Time Provider Department Center   5/4/2023 10:30 AM CORI Peñaloza [facility]        Follow up in about 6 months (around 8/24/2023).      Signature:     [facility]  [address] [facility] [address] [address]    Date of encounter: 2/24/23    Established Patient - Audio Only Telehealth Visit     The patient location is: home  The chief complaint leading to consultation is: lab review  Visit type: Virtual visit with audio only (telephone)  Total time spent with patient: 8 mins       The reason for the audio only service rather than synchronous audio and video virtual visit was related to technical difficulties or patient  preference/necessity.     Each patient to whom I provide medical services by telemedicine is:  (1) informed of the relationship between the physician and patient and the respective role of any other health care provider with respect to management of the patient; and (2) notified that they may decline to receive medical services by telemedicine and may withdraw from such care at any time. Patient verbally consented to receive this service via voice-only telephone call.       This service was not originating from a related E/M service provided within the previous 7 days nor will  to an E/M service or procedure within the next 24 hours or my soonest available appointment.  Prevailing standard of care was able to be met in this audio-only visit.

## 2023-02-23 NOTE — ASSESSMENT & PLAN NOTE
RX zyrtec daily  OTC antihistamines as needed (i.e. Zyrtec, Claritin, Allergra, Benadryl)  Increase PO Fluids  Avoid/limit triggers such as pollen, dust, molds, and pet dander.   Avoid smoke, strong chemicals, cleaning products, perfumes/colognes.

## 2023-02-24 ENCOUNTER — OFFICE VISIT (OUTPATIENT)
Dept: INTERNAL MEDICINE | Facility: CLINIC | Age: 58
End: 2023-02-24
Payer: MEDICAID

## 2023-02-24 DIAGNOSIS — Z11.59 NEED FOR HEPATITIS C SCREENING TEST: ICD-10-CM

## 2023-02-24 DIAGNOSIS — Z11.3 SCREENING EXAMINATION FOR STD (SEXUALLY TRANSMITTED DISEASE): ICD-10-CM

## 2023-02-24 DIAGNOSIS — Z00.00 WELLNESS EXAMINATION: ICD-10-CM

## 2023-02-24 DIAGNOSIS — E11.9 TYPE 2 DIABETES MELLITUS WITHOUT COMPLICATION, WITHOUT LONG-TERM CURRENT USE OF INSULIN: ICD-10-CM

## 2023-02-24 DIAGNOSIS — F41.8 MIXED ANXIETY DEPRESSIVE DISORDER: ICD-10-CM

## 2023-02-24 DIAGNOSIS — J31.0 CHRONIC RHINITIS: ICD-10-CM

## 2023-02-24 DIAGNOSIS — F17.210 CIGARETTE NICOTINE DEPENDENCE WITHOUT COMPLICATION: ICD-10-CM

## 2023-02-24 DIAGNOSIS — Z12.31 ENCOUNTER FOR SCREENING MAMMOGRAM FOR MALIGNANT NEOPLASM OF BREAST: ICD-10-CM

## 2023-02-24 DIAGNOSIS — N39.0 URINARY TRACT INFECTION WITHOUT HEMATURIA, SITE UNSPECIFIED: Primary | ICD-10-CM

## 2023-02-24 DIAGNOSIS — Z11.4 SCREENING FOR HIV (HUMAN IMMUNODEFICIENCY VIRUS): ICD-10-CM

## 2023-02-24 DIAGNOSIS — I10 PRIMARY HYPERTENSION: ICD-10-CM

## 2023-02-24 DIAGNOSIS — E78.2 MIXED HYPERLIPIDEMIA: ICD-10-CM

## 2023-02-24 PROCEDURE — 99214 OFFICE O/P EST MOD 30 MIN: CPT | Mod: 25,95,, | Performed by: NURSE PRACTITIONER

## 2023-02-24 PROCEDURE — 99406 BEHAV CHNG SMOKING 3-10 MIN: CPT | Mod: 95,,, | Performed by: NURSE PRACTITIONER

## 2023-02-24 PROCEDURE — 99406 PR TOBACCO USE CESSATION INTERMEDIATE 3-10 MINUTES: ICD-10-PCS | Mod: 95,,, | Performed by: NURSE PRACTITIONER

## 2023-02-24 PROCEDURE — 1160F PR REVIEW ALL MEDS BY PRESCRIBER/CLIN PHARMACIST DOCUMENTED: ICD-10-PCS | Mod: CPTII,95,, | Performed by: NURSE PRACTITIONER

## 2023-02-24 PROCEDURE — 1160F RVW MEDS BY RX/DR IN RCRD: CPT | Mod: CPTII,95,, | Performed by: NURSE PRACTITIONER

## 2023-02-24 PROCEDURE — 99214 PR OFFICE/OUTPT VISIT, EST, LEVL IV, 30-39 MIN: ICD-10-PCS | Mod: 25,95,, | Performed by: NURSE PRACTITIONER

## 2023-02-24 PROCEDURE — 1159F MED LIST DOCD IN RCRD: CPT | Mod: CPTII,95,, | Performed by: NURSE PRACTITIONER

## 2023-02-24 PROCEDURE — 1159F PR MEDICATION LIST DOCUMENTED IN MEDICAL RECORD: ICD-10-PCS | Mod: CPTII,95,, | Performed by: NURSE PRACTITIONER

## 2023-02-24 RX ORDER — FUROSEMIDE 20 MG/1
20 TABLET ORAL DAILY
Qty: 90 TABLET | Refills: 1 | Status: SHIPPED | OUTPATIENT
Start: 2023-02-24 | End: 2023-08-31 | Stop reason: SDUPTHER

## 2023-02-24 RX ORDER — FLUTICASONE PROPIONATE 50 MCG
1 SPRAY, SUSPENSION (ML) NASAL DAILY
Qty: 16 G | Refills: 6 | Status: SHIPPED | OUTPATIENT
Start: 2023-02-24 | End: 2023-08-31 | Stop reason: SDUPTHER

## 2023-02-24 RX ORDER — LOVASTATIN 40 MG/1
40 TABLET ORAL NIGHTLY
Qty: 90 TABLET | Refills: 1 | Status: SHIPPED | OUTPATIENT
Start: 2023-02-24 | End: 2023-08-31 | Stop reason: SDUPTHER

## 2023-02-24 RX ORDER — CETIRIZINE HYDROCHLORIDE 10 MG/1
10 TABLET ORAL DAILY
Qty: 90 TABLET | Refills: 1 | Status: SHIPPED | OUTPATIENT
Start: 2023-02-24 | End: 2023-08-31 | Stop reason: SDUPTHER

## 2023-02-24 RX ORDER — LISINOPRIL 40 MG/1
40 TABLET ORAL DAILY
Qty: 90 TABLET | Refills: 1 | Status: SHIPPED | OUTPATIENT
Start: 2023-02-24 | End: 2023-08-31 | Stop reason: SDUPTHER

## 2023-02-24 RX ORDER — HYDROCHLOROTHIAZIDE 12.5 MG/1
12.5 TABLET ORAL DAILY
Qty: 90 TABLET | Refills: 1 | Status: SHIPPED | OUTPATIENT
Start: 2023-02-24 | End: 2023-08-31 | Stop reason: SDUPTHER

## 2023-02-24 RX ORDER — BUSPIRONE HYDROCHLORIDE 5 MG/1
5 TABLET ORAL 3 TIMES DAILY
Qty: 270 TABLET | Refills: 1 | Status: SHIPPED | OUTPATIENT
Start: 2023-02-24 | End: 2023-08-31 | Stop reason: SDUPTHER

## 2023-02-24 RX ORDER — METFORMIN HYDROCHLORIDE 500 MG/1
500 TABLET ORAL 2 TIMES DAILY
Qty: 180 TABLET | Refills: 1 | Status: SHIPPED | OUTPATIENT
Start: 2023-02-24 | End: 2023-08-31 | Stop reason: SDUPTHER

## 2023-02-24 RX ORDER — NITROFURANTOIN 25; 75 MG/1; MG/1
100 CAPSULE ORAL 2 TIMES DAILY
Qty: 14 CAPSULE | Refills: 0 | Status: SHIPPED | OUTPATIENT
Start: 2023-02-24 | End: 2023-03-03

## 2023-02-24 RX ORDER — CETIRIZINE HYDROCHLORIDE 10 MG/1
TABLET ORAL
COMMUNITY

## 2023-03-24 ENCOUNTER — HOSPITAL ENCOUNTER (OUTPATIENT)
Dept: RADIOLOGY | Facility: HOSPITAL | Age: 58
Discharge: HOME | End: 2023-03-24
Attending: NURSE PRACTITIONER
Payer: MEDICAID

## 2023-03-24 DIAGNOSIS — Z12.31 ENCOUNTER FOR SCREENING MAMMOGRAM FOR MALIGNANT NEOPLASM OF BREAST: ICD-10-CM

## 2023-03-24 PROCEDURE — 77067 MAMMO DIGITAL SCREENING BILAT WITH TOMO: ICD-10-PCS | Mod: 26,,, | Performed by: RADIOLOGY

## 2023-03-24 PROCEDURE — 77063 MAMMO DIGITAL SCREENING BILAT WITH TOMO: ICD-10-PCS | Mod: 26,,, | Performed by: RADIOLOGY

## 2023-03-24 PROCEDURE — 77067 SCR MAMMO BI INCL CAD: CPT | Mod: TC

## 2023-03-24 PROCEDURE — 77067 SCR MAMMO BI INCL CAD: CPT | Mod: 26,,, | Performed by: RADIOLOGY

## 2023-03-24 PROCEDURE — 77063 BREAST TOMOSYNTHESIS BI: CPT | Mod: 26,,, | Performed by: RADIOLOGY

## 2023-04-03 ENCOUNTER — HOSPITAL ENCOUNTER (OUTPATIENT)
Dept: RADIOLOGY | Facility: HOSPITAL | Age: 58
Discharge: HOME | End: 2023-04-03
Attending: NURSE PRACTITIONER
Payer: MEDICAID

## 2023-04-03 ENCOUNTER — TELEPHONE (OUTPATIENT)
Dept: INTERNAL MEDICINE | Facility: CLINIC | Age: 58
End: 2023-04-03
Payer: MEDICAID

## 2023-04-03 DIAGNOSIS — F17.210 CIGARETTE NICOTINE DEPENDENCE WITHOUT COMPLICATION: ICD-10-CM

## 2023-04-03 PROCEDURE — 71271 CT THORAX LUNG CANCER SCR C-: CPT | Mod: TC

## 2023-04-03 NOTE — TELEPHONE ENCOUNTER
----- Message from CORI Kemp sent at 4/3/2023  1:35 PM CDT -----  Please inform patient that there recent CT of Chest for lung cancer screening shows benign pulmonary nodules that are normally present in patients who smoke. None of these nodules are concerning at this time for any malignancy. We will repeat this exam in 1 year as recommended. Inform patient we always like to encourage smoking cessation and we can refer to smoking cessation classes if interested.    Thanks.

## 2023-05-17 ENCOUNTER — TELEPHONE (OUTPATIENT)
Dept: SMOKING CESSATION | Facility: CLINIC | Age: 58
End: 2023-05-17
Payer: MEDICAID

## 2023-05-17 NOTE — TELEPHONE ENCOUNTER
Called pt for smoking cessation phone intake.  Pt stated that she is not interested.  Pt stated that she does not want to quit smoking.  Discussed program and medications with pt.  Pt stated that she told her physician that she will not quit smoking.  Gave pt our contact information if and when she decides that she needs any help in quitting.

## 2023-05-29 PROBLEM — N39.0 URINARY TRACT INFECTION WITHOUT HEMATURIA: Status: RESOLVED | Noted: 2023-02-23 | Resolved: 2023-05-29

## 2023-06-19 ENCOUNTER — PATIENT MESSAGE (OUTPATIENT)
Dept: ADMINISTRATIVE | Facility: HOSPITAL | Age: 58
End: 2023-06-19
Payer: MEDICAID

## 2023-07-10 ENCOUNTER — PATIENT MESSAGE (OUTPATIENT)
Dept: ADMINISTRATIVE | Facility: HOSPITAL | Age: 58
End: 2023-07-10
Payer: MEDICAID

## 2023-08-31 ENCOUNTER — OFFICE VISIT (OUTPATIENT)
Dept: INTERNAL MEDICINE | Facility: CLINIC | Age: 58
End: 2023-08-31
Payer: MEDICAID

## 2023-08-31 ENCOUNTER — CLINICAL SUPPORT (OUTPATIENT)
Dept: INTERNAL MEDICINE | Facility: CLINIC | Age: 58
End: 2023-08-31
Attending: NURSE PRACTITIONER
Payer: MEDICAID

## 2023-08-31 VITALS
HEIGHT: 66 IN | BODY MASS INDEX: 40.66 KG/M2 | DIASTOLIC BLOOD PRESSURE: 76 MMHG | SYSTOLIC BLOOD PRESSURE: 131 MMHG | HEART RATE: 109 BPM | WEIGHT: 253 LBS | TEMPERATURE: 98 F

## 2023-08-31 DIAGNOSIS — F17.210 CIGARETTE NICOTINE DEPENDENCE WITHOUT COMPLICATION: ICD-10-CM

## 2023-08-31 DIAGNOSIS — E11.9 TYPE 2 DIABETES MELLITUS WITHOUT COMPLICATION, WITHOUT LONG-TERM CURRENT USE OF INSULIN: ICD-10-CM

## 2023-08-31 DIAGNOSIS — F41.8 MIXED ANXIETY DEPRESSIVE DISORDER: ICD-10-CM

## 2023-08-31 DIAGNOSIS — Z12.4 CERVICAL CANCER SCREENING: ICD-10-CM

## 2023-08-31 DIAGNOSIS — Z12.11 COLON CANCER SCREENING: ICD-10-CM

## 2023-08-31 DIAGNOSIS — I10 PRIMARY HYPERTENSION: ICD-10-CM

## 2023-08-31 DIAGNOSIS — J31.0 CHRONIC RHINITIS: ICD-10-CM

## 2023-08-31 DIAGNOSIS — E78.2 MIXED HYPERLIPIDEMIA: ICD-10-CM

## 2023-08-31 DIAGNOSIS — Z00.00 WELLNESS EXAMINATION: Primary | ICD-10-CM

## 2023-08-31 LAB
LEFT EYE DM RETINOPATHY: NEGATIVE
RIGHT EYE DM RETINOPATHY: NEGATIVE

## 2023-08-31 PROCEDURE — 3060F PR POS MICROALBUMINURIA RESULT DOCUMENTED/REVIEW: ICD-10-PCS | Mod: CPTII,,, | Performed by: NURSE PRACTITIONER

## 2023-08-31 PROCEDURE — 1160F RVW MEDS BY RX/DR IN RCRD: CPT | Mod: CPTII,,, | Performed by: NURSE PRACTITIONER

## 2023-08-31 PROCEDURE — 4010F ACE/ARB THERAPY RXD/TAKEN: CPT | Mod: CPTII,,, | Performed by: NURSE PRACTITIONER

## 2023-08-31 PROCEDURE — 3066F NEPHROPATHY DOC TX: CPT | Mod: CPTII,,, | Performed by: NURSE PRACTITIONER

## 2023-08-31 PROCEDURE — 99396 PR PREVENTIVE VISIT,EST,40-64: ICD-10-PCS | Mod: S$PBB,,, | Performed by: NURSE PRACTITIONER

## 2023-08-31 PROCEDURE — 88174 CYTOPATH C/V AUTO IN FLUID: CPT | Performed by: NURSE PRACTITIONER

## 2023-08-31 PROCEDURE — 3060F POS MICROALBUMINURIA REV: CPT | Mod: CPTII,,, | Performed by: NURSE PRACTITIONER

## 2023-08-31 PROCEDURE — 3066F PR DOCUMENTATION OF TREATMENT FOR NEPHROPATHY: ICD-10-PCS | Mod: CPTII,,, | Performed by: NURSE PRACTITIONER

## 2023-08-31 PROCEDURE — 99214 OFFICE O/P EST MOD 30 MIN: CPT | Mod: PBBFAC | Performed by: NURSE PRACTITIONER

## 2023-08-31 PROCEDURE — 87624 HPV HI-RISK TYP POOLED RSLT: CPT

## 2023-08-31 PROCEDURE — 3078F DIAST BP <80 MM HG: CPT | Mod: CPTII,,, | Performed by: NURSE PRACTITIONER

## 2023-08-31 PROCEDURE — 3078F PR MOST RECENT DIASTOLIC BLOOD PRESSURE < 80 MM HG: ICD-10-PCS | Mod: CPTII,,, | Performed by: NURSE PRACTITIONER

## 2023-08-31 PROCEDURE — 99396 PREV VISIT EST AGE 40-64: CPT | Mod: S$PBB,,, | Performed by: NURSE PRACTITIONER

## 2023-08-31 PROCEDURE — 99212 OFFICE O/P EST SF 10 MIN: CPT | Mod: 25,S$PBB,, | Performed by: NURSE PRACTITIONER

## 2023-08-31 PROCEDURE — 3075F SYST BP GE 130 - 139MM HG: CPT | Mod: CPTII,,, | Performed by: NURSE PRACTITIONER

## 2023-08-31 PROCEDURE — 1159F MED LIST DOCD IN RCRD: CPT | Mod: CPTII,,, | Performed by: NURSE PRACTITIONER

## 2023-08-31 PROCEDURE — 3008F PR BODY MASS INDEX (BMI) DOCUMENTED: ICD-10-PCS | Mod: CPTII,,, | Performed by: NURSE PRACTITIONER

## 2023-08-31 PROCEDURE — 3044F PR MOST RECENT HEMOGLOBIN A1C LEVEL <7.0%: ICD-10-PCS | Mod: CPTII,,, | Performed by: NURSE PRACTITIONER

## 2023-08-31 PROCEDURE — 4010F PR ACE/ARB THEARPY RXD/TAKEN: ICD-10-PCS | Mod: CPTII,,, | Performed by: NURSE PRACTITIONER

## 2023-08-31 PROCEDURE — 3075F PR MOST RECENT SYSTOLIC BLOOD PRESS GE 130-139MM HG: ICD-10-PCS | Mod: CPTII,,, | Performed by: NURSE PRACTITIONER

## 2023-08-31 PROCEDURE — 3044F HG A1C LEVEL LT 7.0%: CPT | Mod: CPTII,,, | Performed by: NURSE PRACTITIONER

## 2023-08-31 PROCEDURE — 1160F PR REVIEW ALL MEDS BY PRESCRIBER/CLIN PHARMACIST DOCUMENTED: ICD-10-PCS | Mod: CPTII,,, | Performed by: NURSE PRACTITIONER

## 2023-08-31 PROCEDURE — 3008F BODY MASS INDEX DOCD: CPT | Mod: CPTII,,, | Performed by: NURSE PRACTITIONER

## 2023-08-31 PROCEDURE — 99212 PR OFFICE/OUTPT VISIT, EST, LEVL II, 10-19 MIN: ICD-10-PCS | Mod: 25,S$PBB,, | Performed by: NURSE PRACTITIONER

## 2023-08-31 PROCEDURE — 1159F PR MEDICATION LIST DOCUMENTED IN MEDICAL RECORD: ICD-10-PCS | Mod: CPTII,,, | Performed by: NURSE PRACTITIONER

## 2023-08-31 PROCEDURE — 92228 IMG RTA DETC/MNTR DS PHY/QHP: CPT | Mod: PBBFAC

## 2023-08-31 RX ORDER — FUROSEMIDE 20 MG/1
20 TABLET ORAL DAILY
Qty: 90 TABLET | Refills: 1 | Status: SHIPPED | OUTPATIENT
Start: 2023-08-31 | End: 2024-02-27

## 2023-08-31 RX ORDER — HYDROCHLOROTHIAZIDE 12.5 MG/1
12.5 TABLET ORAL DAILY
Qty: 90 TABLET | Refills: 1 | Status: SHIPPED | OUTPATIENT
Start: 2023-08-31 | End: 2024-02-27

## 2023-08-31 RX ORDER — FLUTICASONE PROPIONATE 50 MCG
1 SPRAY, SUSPENSION (ML) NASAL DAILY
Qty: 16 G | Refills: 6 | Status: SHIPPED | OUTPATIENT
Start: 2023-08-31 | End: 2024-02-27

## 2023-08-31 RX ORDER — LOVASTATIN 40 MG/1
40 TABLET ORAL NIGHTLY
Qty: 90 TABLET | Refills: 1 | Status: SHIPPED | OUTPATIENT
Start: 2023-08-31 | End: 2024-02-27

## 2023-08-31 RX ORDER — CETIRIZINE HYDROCHLORIDE 10 MG/1
10 TABLET ORAL DAILY
Qty: 90 TABLET | Refills: 1 | Status: SHIPPED | OUTPATIENT
Start: 2023-08-31 | End: 2024-02-27

## 2023-08-31 RX ORDER — BUSPIRONE HYDROCHLORIDE 5 MG/1
5 TABLET ORAL 3 TIMES DAILY
Qty: 270 TABLET | Refills: 1 | Status: SHIPPED | OUTPATIENT
Start: 2023-08-31 | End: 2024-02-27

## 2023-08-31 RX ORDER — LISINOPRIL 40 MG/1
40 TABLET ORAL DAILY
Qty: 90 TABLET | Refills: 1 | Status: SHIPPED | OUTPATIENT
Start: 2023-08-31 | End: 2024-02-27

## 2023-08-31 RX ORDER — METFORMIN HYDROCHLORIDE 500 MG/1
500 TABLET ORAL 2 TIMES DAILY
Qty: 180 TABLET | Refills: 1 | Status: SHIPPED | OUTPATIENT
Start: 2023-08-31 | End: 2024-02-27

## 2023-08-31 NOTE — PROGRESS NOTES
Vanita Gleason, CORI   [facility]  [address] [facility] [address] [address]      PATIENT NAME: Zoe Kraft  : 1965  DATE: 23  MRN: [id number]      Reason for Visit / Chief Complaint: Health Maintenance (Lab review )       History of Present Illness / Problem Focused Workflow     Zoe Kraft presents to the clinic with Health Maintenance (Lab review )     59 yo WF here today for f/u. PMHx includes HTN, T2DM, CKD2, HLD, frequent UTIs, depression with anxiety, cholelithiasis, self-reported kidney stones, obesity, chronic rhinitis, and tobacco use (1/2 - 1 ppd, > 35 ppy hx).      Cervical Cancer Screening -23 PAP   Breast Cancer Screening - 23 MMG Ordered  Colon Cancer Screening - 20 Colonoscopy - 3 yr f/u  Osteoporosis Screening - 22 Vitamin D Level 47.4  Diabetic Screening -23 Foot / Fundal Exam   Lung Cancer Screening - 23 Ordered     2023  Pt here today for routine wellness OV and f/u with labs completed. Labs reviewed and discussed with no questions or concerns at this time. Screenings UTD. Meds reviewed and refilled appropriately. Pt agreeable for Pap smear today, completed in Clinic, will send off specimen, pt tolerated.   Denies chest pain, shortness of breath, cough, fever, headache, dizziness, weakness, abdominal pain, nausea, vomiting, diarrhea, constipation, black/bloody stools, unplanned weight loss, night sweats, changes in urinary patterns, burning/odor with urination, depression, anxiety, and SI/HI.             Review of Systems     Review of Systems   Constitutional: Negative.    HENT: Negative.     Eyes: Negative.    Respiratory: Negative.     Cardiovascular: Negative.    Gastrointestinal: Negative.    Endocrine: Negative.    Genitourinary: Negative.    Neurological: Negative.    Psychiatric/Behavioral: Negative.           Medications and Allergies     Medications  Medication List  with Changes/Refills   Current Medications    CALCIUM CARBONATE (CALCIUM 500 ORAL)        CETIRIZINE (ZYRTEC) 10 MG TABLET        CRANBERRY FRUIT CONCENTRATE (AZO CRANBERRY ORAL)       Changed and/or Refilled Medications    Modified Medication Previous Medication    BUSPIRONE (BUSPAR) 5 MG TAB busPIRone (BUSPAR) 5 MG Tab       Take 1 tablet (5 mg total) by mouth 3 (three) times daily. As needed for anxiety    Take 1 tablet (5 mg total) by mouth 3 (three) times daily. As needed for anxiety    CETIRIZINE (ZYRTEC) 10 MG TABLET cetirizine (ZYRTEC) 10 MG tablet       Take 1 tablet (10 mg total) by mouth once daily. For Allergies    Take 1 tablet (10 mg total) by mouth once daily. For Allergies    FLUTICASONE PROPIONATE (FLONASE) 50 MCG/ACTUATION NASAL SPRAY fluticasone propionate (FLONASE) 50 mcg/actuation nasal spray       1 spray (50 mcg total) by Each Nostril route once daily.    1 spray (50 mcg total) by Each Nostril route once daily.    FUROSEMIDE (LASIX) 20 MG TABLET furosemide (LASIX) 20 MG tablet       Take 1 tablet (20 mg total) by mouth once daily.    Take 1 tablet (20 mg total) by mouth once daily.    HYDROCHLOROTHIAZIDE (HYDRODIURIL) 12.5 MG TAB hydroCHLOROthiazide (HYDRODIURIL) 12.5 MG Tab       Take 1 tablet (12.5 mg total) by mouth once daily. For High Blood Pressure    Take 1 tablet (12.5 mg total) by mouth once daily. For High Blood Pressure    LISINOPRIL (PRINIVIL,ZESTRIL) 40 MG TABLET lisinopriL (PRINIVIL,ZESTRIL) 40 MG tablet       Take 1 tablet (40 mg total) by mouth once daily. For High Cholesterol    Take 1 tablet (40 mg total) by mouth once daily. For High Cholesterol    LOVASTATIN (MEVACOR) 40 MG TABLET lovastatin (MEVACOR) 40 MG tablet       Take 1 tablet (40 mg total) by mouth nightly. For High Cholesterol    Take 1 tablet (40 mg total) by mouth nightly. For High Cholesterol    METFORMIN (GLUCOPHAGE) 500 MG TABLET metFORMIN (GLUCOPHAGE) 500 MG tablet       Take 1 tablet (500 mg total) by mouth  2 (two) times daily. For Diabetes    Take 1 tablet (500 mg total) by mouth 2 (two) times daily. For Diabetes         Allergies  Review of patient's allergies indicates:   Allergen Reactions    Codeine     Iodine Other (See Comments)    Naproxen     Naproxen sodium Palpitations and Rash       Physical Examination     Vitals:    08/31/23 0852   BP: 131/76   Pulse: 109   Temp: 98 °F (36.7 °C)     Physical Exam  Vitals reviewed.   Constitutional:       Appearance: Normal appearance. She is normal weight.   HENT:      Head: Normocephalic.   Cardiovascular:      Rate and Rhythm: Normal rate and regular rhythm.      Pulses: Normal pulses.      Heart sounds: Normal heart sounds.   Pulmonary:      Effort: Pulmonary effort is normal.      Breath sounds: Normal breath sounds.   Abdominal:      General: Abdomen is flat.      Palpations: Abdomen is soft.   Musculoskeletal:         General: Normal range of motion.      Cervical back: Normal range of motion.   Skin:     General: Skin is warm and dry.   Neurological:      Mental Status: She is alert.   Psychiatric:         Mood and Affect: Mood normal.           Results     Lab Results   Component Value Date    WBC 8.42 08/31/2023    RBC 3.90 (L) 08/31/2023    HGB 12.0 08/31/2023    HCT 36.9 (L) 08/31/2023    MCV 94.6 (H) 08/31/2023    MCH 30.8 08/31/2023    MCHC 32.5 (L) 08/31/2023    RDW 12.9 08/31/2023     08/31/2023    MPV 9.9 08/31/2023     Sodium Level   Date Value Ref Range Status   08/31/2023 142 136 - 145 mmol/L Final     Potassium Level   Date Value Ref Range Status   08/31/2023 4.0 3.5 - 5.1 mmol/L Final     Carbon Dioxide   Date Value Ref Range Status   08/31/2023 27 22 - 29 mmol/L Final     Blood Urea Nitrogen   Date Value Ref Range Status   08/31/2023 27.4 (H) 9.8 - 20.1 mg/dL Final     Creatinine   Date Value Ref Range Status   08/31/2023 1.14 (H) 0.55 - 1.02 mg/dL Final     Calcium Level Total   Date Value Ref Range Status   08/31/2023 10.3 (H) 8.4 - 10.2  "mg/dL Final     Albumin Level   Date Value Ref Range Status   08/31/2023 4.2 3.5 - 5.0 g/dL Final     Bilirubin Total   Date Value Ref Range Status   08/31/2023 0.4 <=1.5 mg/dL Final     Alkaline Phosphatase   Date Value Ref Range Status   08/31/2023 76 40 - 150 unit/L Final     Aspartate Aminotransferase   Date Value Ref Range Status   08/31/2023 16 5 - 34 unit/L Final     Alanine Aminotransferase   Date Value Ref Range Status   08/31/2023 14 0 - 55 unit/L Final     Estimated GFR-Non    Date Value Ref Range Status   02/01/2022 50 >=90      Lab Results   Component Value Date    CHOL 153 08/31/2023     Lab Results   Component Value Date    HDL 42 08/31/2023     No results found for: "LDLCALC"  Lab Results   Component Value Date    TRIG 88 08/31/2023     No results found for: "CHOLHDL"  Lab Results   Component Value Date    TSH 0.444 08/31/2023     Lab Results   Component Value Date    PHUR 7.0 02/01/2022    PROTEINUA Negative 08/31/2023    GLUCUA Normal 02/01/2022    KETONESU Negative 02/01/2022    OCCULTUA 1+ 02/01/2022    NITRITE 2+ 02/01/2022    LEUKOCYTESUR 250 (A) 08/31/2023     Lab Results   Component Value Date    HGBA1C 5.8 08/31/2023    HGBA1C 5.9 08/24/2022    HGBA1C 6.4 02/01/2022           Assessment         ICD-10-CM ICD-9-CM   1. Wellness examination  Z00.00 V70.0   2. Type 2 diabetes mellitus without complication, without long-term current use of insulin  E11.9 250.00   3. Primary hypertension  I10 401.9   4. Mixed hyperlipidemia  E78.2 272.2   5. Chronic rhinitis  J31.0 472.0   6. Mixed anxiety depressive disorder  F41.8 300.4   7. Cigarette nicotine dependence without complication  F17.210 305.1   8. Colon cancer screening  Z12.11 V76.51   9. Cervical cancer screening  Z12.4 V76.2       Plan      Problem List Items Addressed This Visit          Psychiatric    Mixed anxiety depressive disorder    Overview     Read positive daily meditations, avoid negative media, set healthy " boundaries.  Exercise daily, keep consistent sleep pattern, eat a healthy diet.  Establish good social support, make changes to reduce stress.  Do not drink alcohol or use illicit drugs.  Reports any symptoms of suicidal/homicidal ideations or self harm immediately, go to nearest emergency room.           Current Assessment & Plan     Stable  Continue RX buspar 5 mg prn          Relevant Medications    busPIRone (BUSPAR) 5 MG Tab       ENT    Chronic rhinitis    Overview     OTC antihistamines as needed (i.e. Benadryl)  Increase PO Fluids  Avoid/limit triggers such as pollen, dust, molds, and pet dander.   Avoid smoke, strong chemicals, cleaning products, perfumes/colognes.           Current Assessment & Plan     Stable  Continue RX zyrtec daily          Relevant Medications    cetirizine (ZYRTEC) 10 MG tablet    fluticasone propionate (FLONASE) 50 mcg/actuation nasal spray       Cardiac/Vascular    Mixed hyperlipidemia    Overview     Follow a low cholesterol, low saturated fat diet with less than 200 mg of cholesterol a day.   Avoid fried foods and high saturated fats ([place], sausage, cookies, cakes, chips, cheese, whole milk, butter, mayonnaise, creamy dressings, gravy, stew, gumbo, boudin, cracklins and cream sauces).  Add flax seed or fish oil supplements to diet.   Increase dietary fiber.   Regular exercise improves cholesterol levels.  Physical activity 5 times a week for 30 minutes per day (or 150 minutes per week).   Stressed importance of dietary modifications.           Current Assessment & Plan     Continue RX lovastatin 40 mg q hs  FLP WNL     Latest Reference Range & Units 08/31/23 07:02   Cholesterol <=200 mg/dL 153   HDL 35 - 60 mg/dL 42   LDL Cholesterol External 50.00 - 140.00 mg/dL 93.00   Total Cholesterol/HDL Ratio 0 - 5  4   Triglycerides 37 - 140 mg/dL 88   Very Low Density Lipoprotein  18            Relevant Medications    lovastatin (MEVACOR) 40 MG tablet    Other Relevant Orders    Lipid  Panel    Comprehensive Metabolic Panel    CBC Auto Differential    Primary hypertension    Overview     Low Sodium Diet (Dash Diet - less than 2 grams of sodium per day).  Maintain healthy weight with goal BMI <30. Exercise 30 minutes per day 5 days per week.           Current Assessment & Plan     BP  Continue RX lisinopril 40 mg daily, hctz 12.5 mg daily, and lasix 20 mg daily          Relevant Medications    lisinopriL (PRINIVIL,ZESTRIL) 40 MG tablet    hydroCHLOROthiazide (HYDRODIURIL) 12.5 MG Tab    furosemide (LASIX) 20 MG tablet       Endocrine    Type 2 diabetes mellitus without complication, without long-term current use of insulin    Overview     Follow ADA diet.  Avoid soda, simple sweets, and limit rice/pasta/bread/starches and consume brown options when possible.   Maintain healthy weight with BMI goal <30.   Perform aerobic exercise for 150 minutes per week (or 5 days a week for 30 minutes each day).   Examine feet daily.            Current Assessment & Plan     A1C 5.8  Continue RX metformin 500 mg BID          Relevant Medications    metFORMIN (GLUCOPHAGE) 500 MG tablet    Other Relevant Orders    Urinalysis, Reflex to Urine Culture    Microalbumin/Creatinine Ratio, Urine    Hemoglobin A1C    Diabetic Eye Screening Photo       Other    Cigarette nicotine dependence without complication    Current Assessment & Plan     Dangers of cigarette smoking were reviewed with patient in detail. Patient was Counseled for 3-10 minutes. Nicotine replacement options were discussed. Nicotine replacement was discussed- not prescribed per patient's request         Wellness examination - Primary    Current Assessment & Plan     Pt wellness visit completed today with appropriate lab work.             Other Visit Diagnoses       Colon cancer screening        Relevant Orders    Ambulatory referral/consult to Endo Procedure     Cervical cancer screening        Relevant Orders    Liquid-Based Pap Smear, Screening  Screening            Future Appointments   Date Time Provider Department Center   3/1/2024  8:45 AM Vanita Gleason, CORI [facility] INTM[facility] [place]   10/16/2024  1:00 PM Paz De La Fuente FNP [facility] GYN [facility]            Signature:     [facility]  [address] [address]    Date of encounter: 8/31/23

## 2023-08-31 NOTE — ASSESSMENT & PLAN NOTE
Continue RX lovastatin 40 mg q hs  FLP WNL     Latest Reference Range & Units 08/31/23 07:02   Cholesterol <=200 mg/dL 153   HDL 35 - 60 mg/dL 42   LDL Cholesterol External 50.00 - 140.00 mg/dL 93.00   Total Cholesterol/HDL Ratio 0 - 5  4   Triglycerides 37 - 140 mg/dL 88   Very Low Density Lipoprotein  18

## 2023-09-01 NOTE — PROGRESS NOTES
Zoe Kraft is a 58 y.o. female here for a diabetic eye screening with non-dilated fundus photos per CORI Gould.    Patient cooperative?: Yes  Small pupils?: No  Last eye exam: unknown    For exam results, see Encounter Report.

## 2023-09-05 ENCOUNTER — TELEPHONE (OUTPATIENT)
Dept: INTERNAL MEDICINE | Facility: CLINIC | Age: 58
End: 2023-09-05
Payer: MEDICAID

## 2023-09-05 DIAGNOSIS — N39.0 URINARY TRACT INFECTION WITH HEMATURIA, SITE UNSPECIFIED: Primary | ICD-10-CM

## 2023-09-05 DIAGNOSIS — R31.9 URINARY TRACT INFECTION WITH HEMATURIA, SITE UNSPECIFIED: Primary | ICD-10-CM

## 2023-09-05 RX ORDER — NITROFURANTOIN 25; 75 MG/1; MG/1
100 CAPSULE ORAL 2 TIMES DAILY
Qty: 14 CAPSULE | Refills: 0 | Status: SHIPPED | OUTPATIENT
Start: 2023-09-05 | End: 2023-09-12

## 2023-09-05 NOTE — TELEPHONE ENCOUNTER
I  contacted patient and informed provider Handy, FNP reviewed urine specimen with bacteria in it.New order medication sent to pharmacy . Patient instructed as written above. Patient voiced understanding

## 2023-09-05 NOTE — TELEPHONE ENCOUNTER
Please inform the patient that her urine sample did grow out a bacteria indicating a UTI. I have sent a prescription for her to the pharmacy for her to start taking. Also, advise the following.    Start antibiotics as prescribed.   Complete the full course of the medication.  Report any continuing signs such as nausea/vomiting,visible blood in urine, increased low back or flank pain, worsening burning upon urination after antibiotic completion or fever.  Drink plenty of water.  Avoid soda or carbonated beverages.   Urinate frequently; do not hold urine for extended periods of time.  Wear cotton underwear, avoid tight fitting pants.  Use OTC AZO or pyridium for relief of urinary spasms.  Women: wipe front to back, urinate after sexual intercourse, and avoid scented or irritating feminine products.    Thanks,    CORI Gould

## 2023-09-06 LAB — PSYCHE PATHOLOGY RESULT: NORMAL

## 2023-09-07 DIAGNOSIS — Z12.11 SCREEN FOR COLON CANCER: Primary | ICD-10-CM

## 2023-09-07 RX ORDER — POLYETHYLENE GLYCOL 3350, SODIUM SULFATE, SODIUM CHLORIDE, POTASSIUM CHLORIDE, SODIUM ASCORBATE, AND ASCORBIC ACID 7.5-2.691G
KIT ORAL
Qty: 1 KIT | Refills: 0 | Status: SHIPPED | OUTPATIENT
Start: 2023-09-07

## 2023-10-04 ENCOUNTER — ANESTHESIA EVENT (OUTPATIENT)
Dept: ENDOSCOPY | Facility: HOSPITAL | Age: 58
End: 2023-10-04
Payer: MEDICAID

## 2023-10-04 NOTE — ANESTHESIA PREPROCEDURE EVALUATION
10/04/2023  Zoe Kraft is a 58 y.o., female history of CLN poylps    Vitals:    10/06/23 0722   BP: 117/70   Pulse: 84   Resp: 20   Temp: 36.7 °C (98 °F)         PMHx of HLD, HTN, DM2, Smoker, Anxiety, Obesity    -- suspected familial history of pseudocholinesterase deficiency; at length discussion with patient and family member &  Succinylcholine documented as allergy         Active Ambulatory Problems     Diagnosis Date Noted    Chronic back pain 2022    Chronic rhinitis 2022    Mixed hyperlipidemia 2022    Primary hypertension 2022    Mixed anxiety depressive disorder 2022    Spondylosis of cervical spine 2022    Spondylosis of lumbar spine 2022    Stage 2 chronic kidney disease due to type 2 diabetes mellitus 2022    Type 2 diabetes mellitus without complication, without long-term current use of insulin 2022    Cigarette nicotine dependence without complication 2022    Wellness examination 2023     Resolved Ambulatory Problems     Diagnosis Date Noted    Urinary tract infection without hematuria 2023     Past Medical History:   Diagnosis Date    Anxiety disorder, unspecified     Chronic sinusitis, unspecified     CS (cervical spondylosis)     Depression     DM (diabetes mellitus), type 2     Fractured lateral malleolus     HTN (hypertension)     Knee pain     Lumbar spondylosis     Numbness of finger     Personal history of colonic polyps 2020    Recurrent UTI (urinary tract infection)     Stress fracture of metatarsal bone     Swelling     Tobacco abuse        Past Surgical History:   Procedure Laterality Date    biopsy gastrointestinal  2020     SECTION  1985     SECTION  1986     SECTION  1993    COLONOSCOPY  2020    KIDNEY STONE  SURGERY Left 2003    LITHOTRIPSY      x 3    TONSILLECTOMY AND ADENOIDECTOMY       Lab Results   Component Value Date    WBC 8.42 08/31/2023    HGB 12.0 08/31/2023    HCT 36.9 (L) 08/31/2023     08/31/2023    CHOL 153 08/31/2023    TRIG 88 08/31/2023    HDL 42 08/31/2023    ALT 14 08/31/2023    AST 16 08/31/2023     08/31/2023    K 4.0 08/31/2023    CREATININE 1.14 (H) 08/31/2023    BUN 27.4 (H) 08/31/2023    CO2 27 08/31/2023    TSH 0.444 08/31/2023    HGBA1C 5.8 08/31/2023         Pre-op Assessment    I have reviewed the Patient Summary Reports.     I have reviewed the Nursing Notes. I have reviewed the NPO Status.   I have reviewed the Medications.     Review of Systems  Anesthesia Hx:  No problems with previous Anesthesia  History of prior surgery of interest to airway management or planning: Denies Family Hx of Anesthesia complications.   Denies Personal Hx of Anesthesia complications.   Hematology/Oncology:  Hematology Normal   Oncology Normal     EENT/Dental:EENT/Dental Normal   Cardiovascular:  Cardiovascular Normal     Pulmonary:  Pulmonary Normal    Renal/:  Renal/ Normal     Hepatic/GI:  Hepatic/GI Normal    Musculoskeletal:  Musculoskeletal Normal    Neurological:  Neurology Normal    Endocrine:  Endocrine Normal    Dermatological:  Skin Normal    Psych:  Psychiatric Normal           Physical Exam  General: Well nourished, Cooperative, Alert and Oriented    Airway:  Mallampati: I / I  Mouth Opening: Normal  TM Distance: Normal      Dental:  Dentures        Anesthesia Plan  Type of Anesthesia, risks & benefits discussed:    Anesthesia Type: Gen Natural Airway  Intra-op Monitoring Plan: Standard ASA Monitors  Post Op Pain Control Plan: IV/PO Opioids PRN  (medical reason for not using multimodal pain management)  Induction:  IV  Airway Plan:  with cervical collar  Informed Consent: Informed consent signed with the Patient and all parties understand the risks and agree with anesthesia  plan.  All questions answered. Patient consented to blood products? No  ASA Score: 3  Day of Surgery Review of History & Physical: H&P Update referred to the surgeon/provider.    Ready For Surgery From Anesthesia Perspective.     .

## 2023-10-06 ENCOUNTER — HOSPITAL ENCOUNTER (OUTPATIENT)
Facility: HOSPITAL | Age: 58
Discharge: HOME | End: 2023-10-06
Attending: COLON & RECTAL SURGERY | Admitting: COLON & RECTAL SURGERY
Payer: MEDICAID

## 2023-10-06 ENCOUNTER — ANESTHESIA (OUTPATIENT)
Dept: ENDOSCOPY | Facility: HOSPITAL | Age: 58
End: 2023-10-06
Payer: MEDICAID

## 2023-10-06 DIAGNOSIS — Z12.11 SCREEN FOR COLON CANCER: ICD-10-CM

## 2023-10-06 LAB — POCT GLUCOSE: 104 MG/DL (ref 70–110)

## 2023-10-06 PROCEDURE — 25000003 PHARM REV CODE 250: Performed by: NURSE ANESTHETIST, CERTIFIED REGISTERED

## 2023-10-06 PROCEDURE — 45385 COLONOSCOPY W/LESION REMOVAL: CPT | Performed by: COLON & RECTAL SURGERY

## 2023-10-06 PROCEDURE — D9220A PRA ANESTHESIA: Mod: ,,, | Performed by: NURSE ANESTHETIST, CERTIFIED REGISTERED

## 2023-10-06 PROCEDURE — 63600175 PHARM REV CODE 636 W HCPCS: Performed by: NURSE ANESTHETIST, CERTIFIED REGISTERED

## 2023-10-06 PROCEDURE — 63600175 PHARM REV CODE 636 W HCPCS: Performed by: SPECIALIST

## 2023-10-06 PROCEDURE — 45384 COLONOSCOPY W/LESION REMOVAL: CPT | Mod: 59 | Performed by: COLON & RECTAL SURGERY

## 2023-10-06 PROCEDURE — 37000009 HC ANESTHESIA EA ADD 15 MINS: Performed by: COLON & RECTAL SURGERY

## 2023-10-06 PROCEDURE — 88305 TISSUE EXAM BY PATHOLOGIST: CPT | Mod: TC | Performed by: COLON & RECTAL SURGERY

## 2023-10-06 PROCEDURE — D9220A PRA ANESTHESIA: ICD-10-PCS | Mod: ,,, | Performed by: NURSE ANESTHETIST, CERTIFIED REGISTERED

## 2023-10-06 PROCEDURE — 27201423 OPTIME MED/SURG SUP & DEVICES STERILE SUPPLY: Performed by: COLON & RECTAL SURGERY

## 2023-10-06 PROCEDURE — 45380 COLONOSCOPY AND BIOPSY: CPT | Mod: 59 | Performed by: COLON & RECTAL SURGERY

## 2023-10-06 PROCEDURE — 37000008 HC ANESTHESIA 1ST 15 MINUTES: Performed by: COLON & RECTAL SURGERY

## 2023-10-06 RX ORDER — PHENYLEPHRINE HYDROCHLORIDE 10 MG/ML
INJECTION INTRAVENOUS
Status: DISCONTINUED | OUTPATIENT
Start: 2023-10-06 | End: 2023-10-06

## 2023-10-06 RX ORDER — SODIUM CHLORIDE, SODIUM LACTATE, POTASSIUM CHLORIDE, CALCIUM CHLORIDE 600; 310; 30; 20 MG/100ML; MG/100ML; MG/100ML; MG/100ML
INJECTION, SOLUTION INTRAVENOUS CONTINUOUS
Status: DISCONTINUED | OUTPATIENT
Start: 2023-10-06 | End: 2023-10-06 | Stop reason: HOSPADM

## 2023-10-06 RX ORDER — LIDOCAINE HYDROCHLORIDE 20 MG/ML
INJECTION, SOLUTION EPIDURAL; INFILTRATION; INTRACAUDAL; PERINEURAL
Status: DISCONTINUED | OUTPATIENT
Start: 2023-10-06 | End: 2023-10-06

## 2023-10-06 RX ORDER — PROPOFOL 10 MG/ML
VIAL (ML) INTRAVENOUS
Status: DISCONTINUED | OUTPATIENT
Start: 2023-10-06 | End: 2023-10-06

## 2023-10-06 RX ADMIN — PROPOFOL 30 MG: 10 INJECTION, EMULSION INTRAVENOUS at 08:10

## 2023-10-06 RX ADMIN — PHENYLEPHRINE HYDROCHLORIDE 200 MCG: 10 INJECTION INTRAVENOUS at 09:10

## 2023-10-06 RX ADMIN — PROPOFOL 50 MG: 10 INJECTION, EMULSION INTRAVENOUS at 08:10

## 2023-10-06 RX ADMIN — LIDOCAINE HYDROCHLORIDE 100 MG: 20 INJECTION, SOLUTION EPIDURAL; INFILTRATION; INTRACAUDAL; PERINEURAL at 08:10

## 2023-10-06 RX ADMIN — PROPOFOL 90 MG: 10 INJECTION, EMULSION INTRAVENOUS at 08:10

## 2023-10-06 RX ADMIN — PROPOFOL 50 MG: 10 INJECTION, EMULSION INTRAVENOUS at 09:10

## 2023-10-06 RX ADMIN — SODIUM CHLORIDE, POTASSIUM CHLORIDE, SODIUM LACTATE AND CALCIUM CHLORIDE: 600; 310; 30; 20 INJECTION, SOLUTION INTRAVENOUS at 08:10

## 2023-10-06 RX ADMIN — SODIUM CHLORIDE, POTASSIUM CHLORIDE, SODIUM LACTATE AND CALCIUM CHLORIDE: 600; 310; 30; 20 INJECTION, SOLUTION INTRAVENOUS at 07:10

## 2023-10-06 RX ADMIN — PHENYLEPHRINE HYDROCHLORIDE 200 MCG: 10 INJECTION INTRAVENOUS at 08:10

## 2023-10-06 NOTE — PROVATION PATIENT INSTRUCTIONS
Discharge Summary/Instructions after an Endoscopic Procedure  Patient Name: Zoe Kraft  Patient MRN: [id number]  Patient YOB: 1965 Friday, October 6, 2023  Adrian Jorge MD  Dear patient,  As a result of recent federal legislation (The Federal Cures Act), you may   receive lab or pathology results from your procedure in your [facility]   account before your physician is able to contact you. Your physician or   their representative will relay the results to you with their   recommendations at their soonest availability.  Thank you,  RESTRICTIONS:  During your procedure today, you received medications for sedation.  These   medications may affect your judgment, balance and coordination.  Therefore,   for 24 hours, you have the following restrictions:   - DO NOT drive a car, operate machinery, make legal/financial decisions,   sign important papers or drink alcohol.    ACTIVITY:  Today: no heavy lifting, straining or running due to procedural   sedation/anesthesia.  The following day: return to full activity including work.  DIET:  Eat and drink normally unless instructed otherwise.     TREATMENT FOR COMMON SIDE EFFECTS:  - Mild abdominal pain, nausea, belching, bloating or excessive gas:  rest,   eat lightly and use a heating pad.  - Sore Throat: treat with throat lozenges and/or gargle with warm salt   water.  - Because air was used during the procedure, expelling large amounts of air   from your rectum or belching is normal.  - If a bowel prep was taken, you may not have a bowel movement for 1-3 days.    This is normal.  SYMPTOMS TO WATCH FOR AND REPORT TO YOUR PHYSICIAN:  1. Abdominal pain or bloating, other than gas cramps.  2. Chest pain.  3. Back pain.  4. Signs of infection such as: chills or fever occurring within 24 hours   after the procedure.  5. Rectal bleeding, which would show as bright red, maroon, or black stools.   (A tablespoon of blood from the rectum is not serious,  especially if   hemorrhoids are present.)  6. Vomiting.  7. Weakness or dizziness.  GO DIRECTLY TO THE NEAREST EMERGENCY ROOM IF YOU HAVE ANY OF THE FOLLOWING:      Difficulty breathing              Chills and/or fever over 101 F   Persistent vomiting and/or vomiting blood   Severe abdominal pain   Severe chest pain   Black, tarry stools   Bleeding- more than one tablespoon   Any other symptom or condition that you feel may need urgent attention  Your doctor recommends these additional instructions:  If any biopsies were taken, your doctors clinic will contact you in 1 to 2   weeks with any results.  - Repeat colonoscopy in 5 years for surveillance.   - Discharge patient to home.   - Resume previous diet.   - Continue present medications.   - Repeat colonoscopy in 5 years for surveillance.  For questions, problems or results please call your physician - Adrian Jorge MD at Work:  [phone].  [facility] , EMERGENCY ROOM PHONE NUMBER: [phone]  IF A COMPLICATION OR EMERGENCY SITUATION ARISES AND YOU ARE UNABLE TO REACH   YOUR PHYSICIAN - GO DIRECTLY TO THE EMERGENCY ROOM.  MD Adrian Neely MD  10/6/2023 9:15:06 AM  This report has been verified and signed electronically.  Dear patient,  As a result of recent federal legislation (The Federal Cures Act), you may   receive lab or pathology results from your procedure in your [facility]   account before your physician is able to contact you. Your physician or   their representative will relay the results to you with their   recommendations at their soonest availability.  Thank you,  PROVATION

## 2023-10-06 NOTE — ANESTHESIA POSTPROCEDURE EVALUATION
Anesthesia Post Evaluation    Patient: Zoe Kraft    Procedure(s) Performed: Procedure(s) (LRB):  COLONOSCOPY (N/A)    Final Anesthesia Type: general      Patient location during evaluation: GI PACU  Patient participation: Yes- Able to Participate  Level of consciousness: awake and alert  Post-procedure vital signs: reviewed and stable  Pain management: adequate  Airway patency: patent    PONV status at discharge: No PONV  Anesthetic complications: no      Cardiovascular status: hemodynamically stable  Respiratory status: spontaneous ventilation  Hydration status: euvolemic  Follow-up not needed.          Vitals Value Taken Time   /70 10/06/23 0722   Temp 36.7 °C (98 °F) 10/06/23 0722   Pulse 84 10/06/23 0722   Resp 20 10/06/23 0722   SpO2 99 % 10/06/23 0722         No case tracking events are documented in the log.      Pain/Hosea Score: No data recorded

## 2023-10-06 NOTE — H&P
Colonoscopy History and Physical    Patient Name: Zoe Kraft  MRN: [id number]  : 1965  Date of Procedure:  10/6/2023  Referring Physician: Adrian Jorge, *  Primary Physician: Vanita Gleason FNP  Procedure Physician: Karol Serrano MD, MPH     Procedure - Colonoscopy  ASA - per anesthesia  Mallampati - per anesthesia  History of Anesthesia problems - no  Family history Anesthesia problems -  no   Plan of anesthesia - General    Diagnosis: screening for colon cancer  Chief Complaint: Same as above    HPI: Patient is an 58 y.o. female is here for the above. Reports history of hemorrhoids. Denies any recent weight loss, abdominal pain, or blood in stool. No complaints today    Last colonoscopy: 3 years ago- polyps found   Family history: none  Anticoagulation: none    ROS:  Constitutional: No fevers, chills, No weight loss  CV: No chest pain  Pulm: No cough, No shortness of breath  GI: see HPI    Medical History:   Past Medical History:   Diagnosis Date    Anxiety disorder, unspecified     Chronic back pain     Chronic rhinitis     Chronic sinusitis, unspecified     CS (cervical spondylosis)     Depression     DM (diabetes mellitus), type 2     Fractured lateral malleolus     HTN (hypertension)     Knee pain     Lumbar spondylosis     Mixed hyperlipidemia     Numbness of finger     Personal history of colonic polyps 2020    Source: Outside Record Colonoscopy Report    Recurrent UTI (urinary tract infection)     Stress fracture of metatarsal bone     Swelling     Tobacco abuse          Surgical History:   Past Surgical History:   Procedure Laterality Date    biopsy gastrointestinal  2020     SECTION  1985     SECTION  1986     SECTION  1993    COLONOSCOPY  2020    KIDNEY STONE SURGERY Left     LITHOTRIPSY      x 3    TONSILLECTOMY AND ADENOIDECTOMY         Family History:   Family History   Problem Relation Age of Onset    Heart  disease Mother     Hypertension Father     Heart disease Father     Cancer Father     Cancer Maternal Aunt     Cancer Paternal Uncle    .    Social History:   Social History     Socioeconomic History    Marital status: Single   Tobacco Use    Smoking status: Every Day     Current packs/day: 1.00     Average packs/day: 1 pack/day for 44.8 years (44.8 ttl pk-yrs)     Types: Cigarettes     Start date: 1979    Smokeless tobacco: Never   Substance and Sexual Activity    Alcohol use: Yes     Comment: occassionally    Drug use: Never    Sexual activity: Yes     Social Determinants of Health     Financial Resource Strain: Low Risk  (2/24/2023)    Overall Financial Resource Strain (CARDIA)     Difficulty of Paying Living Expenses: Not hard at all   Food Insecurity: No Food Insecurity (2/24/2023)    Hunger Vital Sign     Worried About Running Out of Food in the Last Year: Never true     Ran Out of Food in the Last Year: Never true   Transportation Needs: No Transportation Needs (2/24/2023)    PRAPARE - Transportation     Lack of Transportation (Medical): No     Lack of Transportation (Non-Medical): No   Physical Activity: Inactive (2/24/2023)    Exercise Vital Sign     Days of Exercise per Week: 0 days     Minutes of Exercise per Session: 0 min   Stress: No Stress Concern Present (2/24/2023)    [employer] [place] - Occupational Stress Questionnaire     Feeling of Stress : Not at all   Social Connections: Socially Isolated (2/24/2023)    Social Connection and Isolation Panel [NHANES]     Frequency of Communication with Friends and Family: More than three times a week     Frequency of Social Gatherings with Friends and Family: Never     Attends Restoration Services: Never     Active Member of Clubs or Organizations: No     Attends Club or Organization Meetings: Never     Marital Status:    Housing Stability: Unknown (2/24/2023)    Housing Stability Vital Sign     Unable to Pay for Housing in the Last  Year: No     Unstable Housing in the Last Year: No       Review of patient's allergies indicates:   Allergen Reactions    Codeine     Iodine Other (See Comments)    Naproxen     Succinylcholine     Naproxen sodium Palpitations and Rash       Medications:   Medications Prior to Admission   Medication Sig Dispense Refill Last Dose    busPIRone (BUSPAR) 5 MG Tab Take 1 tablet (5 mg total) by mouth 3 (three) times daily. As needed for anxiety 270 tablet 1 10/5/2023    calcium carbonate (CALCIUM 500 ORAL)    10/5/2023    cetirizine (ZYRTEC) 10 MG tablet    10/5/2023    cetirizine (ZYRTEC) 10 MG tablet Take 1 tablet (10 mg total) by mouth once daily. For Allergies 90 tablet 1 10/5/2023    cranberry fruit concentrate (AZO CRANBERRY ORAL)    10/5/2023    hydroCHLOROthiazide (HYDRODIURIL) 12.5 MG Tab Take 1 tablet (12.5 mg total) by mouth once daily. For High Blood Pressure 90 tablet 1 10/5/2023    lisinopriL (PRINIVIL,ZESTRIL) 40 MG tablet Take 1 tablet (40 mg total) by mouth once daily. For High Cholesterol 90 tablet 1 10/5/2023    lovastatin (MEVACOR) 40 MG tablet Take 1 tablet (40 mg total) by mouth nightly. For High Cholesterol 90 tablet 1 10/5/2023    metFORMIN (GLUCOPHAGE) 500 MG tablet Take 1 tablet (500 mg total) by mouth 2 (two) times daily. For Diabetes 180 tablet 1 10/5/2023    polyethylene glycol (MOVIPREP) 100-7.5-2.691 gram solution Take as directed per instructions provided by [facility] 1 kit 0 10/6/2023    fluticasone propionate (FLONASE) 50 mcg/actuation nasal spray 1 spray (50 mcg total) by Each Nostril route once daily. 16 g 6 More than a month    furosemide (LASIX) 20 MG tablet Take 1 tablet (20 mg total) by mouth once daily. 90 tablet 1 More than a month         Physical Exam:    Vital Signs:   Vitals:    10/06/23 0722   BP: 117/70   Pulse: 84   Resp: 20   Temp: 98 °F (36.7 °C)     /70 (BP Location: Left arm, Patient Position: Lying)   Pulse 84   Temp 98 °F (36.7 °C) (Oral)   Resp 20   Ht 5'  "6" (1.676 m)   Wt 113.4 kg (250 lb)   SpO2 99%   Breastfeeding No   BMI 40.35 kg/m²     General:          Well appearing in no acute distress  Lungs: respirations unlabored  Heart: Regular rate and rhythm  Abdomen:         Soft, non-tender, bowel sounds normal, no masses, no organomegaly        Labs:  Lab Results   Component Value Date    WBC 8.42 08/31/2023    HGB 12.0 08/31/2023    HCT 36.9 (L) 08/31/2023    MCV 94.6 (H) 08/31/2023     08/31/2023     No results found for: "INR", "PT", "APTT"  Lab Results   Component Value Date     08/31/2023    K 4.0 08/31/2023    CO2 27 08/31/2023    BUN 27.4 (H) 08/31/2023    CREATININE 1.14 (H) 08/31/2023    LABPROT 7.3 08/31/2023    ALBUMIN 4.2 08/31/2023    BILITOT 0.4 08/31/2023    ALKPHOS 76 08/31/2023    ALT 14 08/31/2023    AST 16 08/31/2023         Assessment and Plan:  58F here for screening colonoscopy     History reviewed, vital signs satisfactory, cardiopulmonary status satisfactory.  I have explained the sedation options, risks, benefits, and alternatives of this endoscopic procedure to the patient including but not limited to bleeding, inflammation, infection, perforation, and death.  All questions were answered and the patient consented to proceed with procedure as planned.   The patient is deemed an appropriate candidate for the sedation as planned.      Ann-Marie Guajardo MD  [employer] General Surgery PGY2    10/6/2023  8:22 AM     "

## 2023-10-06 NOTE — TRANSFER OF CARE
"Anesthesia Transfer of Care Note    Patient: Zoe Kraft    Procedure(s) Performed: Procedure(s) (LRB):  COLONOSCOPY (N/A)    Patient location: GI    Anesthesia Type: general    Post pain: adequate analgesia    Post assessment: no apparent anesthetic complications    Post vital signs: stable    Level of consciousness: sedated    Nausea/Vomiting: no nausea/vomiting    Complications: none    Transfer of care protocol was followed      Last vitals:   Visit Vitals  /70 (BP Location: Left arm, Patient Position: Lying)   Pulse 84   Temp 36.7 °C (98 °F) (Oral)   Resp 20   Ht 5' 6" (1.676 m)   Wt 113.4 kg (250 lb)   SpO2 99%   Breastfeeding No   BMI 40.35 kg/m²     "

## 2023-10-09 VITALS
OXYGEN SATURATION: 97 % | SYSTOLIC BLOOD PRESSURE: 119 MMHG | DIASTOLIC BLOOD PRESSURE: 86 MMHG | WEIGHT: 250 LBS | BODY MASS INDEX: 40.18 KG/M2 | HEIGHT: 66 IN | TEMPERATURE: 98 F | RESPIRATION RATE: 20 BRPM | HEART RATE: 79 BPM

## 2023-10-11 LAB
ESTROGEN SERPL-MCNC: NORMAL PG/ML
INSULIN SERPL-ACNC: NORMAL U[IU]/ML
LAB AP CLINICAL INFORMATION: NORMAL
LAB AP GROSS DESCRIPTION: NORMAL
LAB AP REPORT FOOTNOTES: NORMAL
T3RU NFR SERPL: NORMAL %

## 2023-12-04 PROBLEM — Z00.00 WELLNESS EXAMINATION: Status: RESOLVED | Noted: 2023-08-31 | Resolved: 2023-12-04

## 2024-10-23 ENCOUNTER — TELEPHONE (OUTPATIENT)
Dept: INTERNAL MEDICINE | Facility: CLINIC | Age: 59
End: 2024-10-23
Payer: MEDICAID

## 2024-10-23 NOTE — TELEPHONE ENCOUNTER
I attempted to contact patient.   She needs f/u appt. To satisfy A1C metric.   She has missed / noshowed x 2 since 3/2024   I left detailed message

## (undated) DEVICE — TRAP ETRAP POLYP 50 TRAY

## (undated) DEVICE — FORCEP ALLIGATOR 2.8MM W/NDL

## (undated) DEVICE — KIT SURGICAL COLON .25 1.1OZ

## (undated) DEVICE — SNARE EXACTO COLD

## (undated) DEVICE — MANIFOLD 4 PORT